# Patient Record
Sex: MALE | Race: OTHER | HISPANIC OR LATINO | URBAN - METROPOLITAN AREA
[De-identification: names, ages, dates, MRNs, and addresses within clinical notes are randomized per-mention and may not be internally consistent; named-entity substitution may affect disease eponyms.]

---

## 2020-04-23 ENCOUNTER — TELEMEDICINE (OUTPATIENT)
Dept: INTERNAL MEDICINE CLINIC | Facility: CLINIC | Age: 35
End: 2020-04-23

## 2020-04-23 DIAGNOSIS — E10.42 TYPE 1 DIABETES MELLITUS WITH DIABETIC POLYNEUROPATHY (HCC): Primary | ICD-10-CM

## 2020-04-23 DIAGNOSIS — K21.9 GASTROESOPHAGEAL REFLUX DISEASE, ESOPHAGITIS PRESENCE NOT SPECIFIED: ICD-10-CM

## 2020-04-23 DIAGNOSIS — R53.83 FATIGUE, UNSPECIFIED TYPE: ICD-10-CM

## 2020-04-23 DIAGNOSIS — F90.2 ATTENTION DEFICIT HYPERACTIVITY DISORDER (ADHD), COMBINED TYPE: ICD-10-CM

## 2020-04-23 PROCEDURE — T1015 CLINIC SERVICE: HCPCS | Performed by: HOSPITALIST

## 2020-04-23 PROCEDURE — 99214 OFFICE O/P EST MOD 30 MIN: CPT | Performed by: HOSPITALIST

## 2020-04-23 RX ORDER — GABAPENTIN 300 MG/1
300 CAPSULE ORAL 3 TIMES DAILY
Qty: 270 CAPSULE | Refills: 1 | Status: SHIPPED | OUTPATIENT
Start: 2020-04-23 | End: 2020-11-11 | Stop reason: SDUPTHER

## 2020-04-23 RX ORDER — LISINOPRIL 5 MG/1
1 TABLET ORAL DAILY
COMMUNITY
Start: 2013-04-03 | End: 2020-05-27 | Stop reason: SDUPTHER

## 2020-04-23 RX ORDER — INSULIN ASPART 100 [IU]/ML
15 INJECTION, SOLUTION INTRAVENOUS; SUBCUTANEOUS
Qty: 5 PEN | Refills: 5 | Status: SHIPPED | OUTPATIENT
Start: 2020-04-23 | End: 2020-11-10 | Stop reason: SDUPTHER

## 2020-04-23 RX ORDER — LANCETS
EACH MISCELLANEOUS 3 TIMES DAILY
Qty: 200 EACH | Refills: 3 | Status: SHIPPED | OUTPATIENT
Start: 2020-04-23

## 2020-04-23 RX ORDER — ATORVASTATIN CALCIUM 10 MG/1
10 TABLET, FILM COATED ORAL DAILY
COMMUNITY
Start: 2014-07-25 | End: 2020-05-27 | Stop reason: SDUPTHER

## 2020-04-23 RX ORDER — OMEPRAZOLE 40 MG/1
40 CAPSULE, DELAYED RELEASE ORAL DAILY
COMMUNITY
Start: 2014-07-25 | End: 2020-04-23 | Stop reason: SDUPTHER

## 2020-04-23 RX ORDER — OMEPRAZOLE 40 MG/1
40 CAPSULE, DELAYED RELEASE ORAL DAILY
Qty: 90 CAPSULE | Refills: 1 | Status: SHIPPED | OUTPATIENT
Start: 2020-04-23 | End: 2020-12-22 | Stop reason: SDUPTHER

## 2020-04-23 RX ORDER — LANCING DEVICE/LANCETS
KIT MISCELLANEOUS 3 TIMES DAILY
Qty: 1 KIT | Refills: 3 | Status: SHIPPED | OUTPATIENT
Start: 2020-04-23

## 2020-04-23 RX ORDER — GABAPENTIN 100 MG/1
100 CAPSULE ORAL 3 TIMES DAILY
COMMUNITY
End: 2020-04-23 | Stop reason: SDUPTHER

## 2020-05-27 DIAGNOSIS — E10.42 TYPE 1 DIABETES MELLITUS WITH DIABETIC POLYNEUROPATHY (HCC): Primary | ICD-10-CM

## 2020-05-27 RX ORDER — ATORVASTATIN CALCIUM 10 MG/1
10 TABLET, FILM COATED ORAL DAILY
Qty: 90 TABLET | Refills: 3 | Status: SHIPPED | OUTPATIENT
Start: 2020-05-27 | End: 2020-12-22 | Stop reason: SDUPTHER

## 2020-05-27 RX ORDER — LISINOPRIL 5 MG/1
5 TABLET ORAL DAILY
Qty: 90 TABLET | Refills: 3 | Status: SHIPPED | OUTPATIENT
Start: 2020-05-27 | End: 2020-12-22 | Stop reason: SDUPTHER

## 2020-11-06 DIAGNOSIS — K21.9 GASTROESOPHAGEAL REFLUX DISEASE: ICD-10-CM

## 2020-11-06 RX ORDER — OMEPRAZOLE 40 MG/1
CAPSULE, DELAYED RELEASE ORAL
Qty: 90 CAPSULE | Refills: 0 | OUTPATIENT
Start: 2020-11-06

## 2020-11-10 ENCOUNTER — TELEPHONE (OUTPATIENT)
Dept: INTERNAL MEDICINE CLINIC | Facility: CLINIC | Age: 35
End: 2020-11-10

## 2020-11-10 DIAGNOSIS — E10.42 TYPE 1 DIABETES MELLITUS WITH DIABETIC POLYNEUROPATHY (HCC): ICD-10-CM

## 2020-11-10 RX ORDER — INSULIN GLARGINE 100 [IU]/ML
50 INJECTION, SOLUTION SUBCUTANEOUS DAILY
Qty: 10 ML | Refills: 3 | Status: SHIPPED | OUTPATIENT
Start: 2020-11-10 | End: 2020-12-22 | Stop reason: ALTCHOICE

## 2020-11-10 RX ORDER — INSULIN GLARGINE 100 [IU]/ML
50 INJECTION, SOLUTION SUBCUTANEOUS
Qty: 5 PEN | Refills: 5 | Status: SHIPPED | OUTPATIENT
Start: 2020-11-10 | End: 2020-12-22

## 2020-11-10 RX ORDER — INSULIN ASPART 100 [IU]/ML
15 INJECTION, SOLUTION INTRAVENOUS; SUBCUTANEOUS
Qty: 5 PEN | Refills: 5 | Status: SHIPPED | OUTPATIENT
Start: 2020-11-10 | End: 2020-12-22 | Stop reason: ALTCHOICE

## 2020-11-10 RX ORDER — GABAPENTIN 100 MG/1
200 CAPSULE ORAL 3 TIMES DAILY
Qty: 90 CAPSULE | Refills: 3 | Status: SHIPPED | OUTPATIENT
Start: 2020-11-10 | End: 2020-12-22 | Stop reason: ALTCHOICE

## 2020-11-11 DIAGNOSIS — E10.42 TYPE 1 DIABETES MELLITUS WITH DIABETIC POLYNEUROPATHY (HCC): ICD-10-CM

## 2020-11-11 RX ORDER — GABAPENTIN 300 MG/1
300 CAPSULE ORAL 3 TIMES DAILY
Qty: 270 CAPSULE | Refills: 2 | Status: SHIPPED | OUTPATIENT
Start: 2020-11-11 | End: 2021-11-29

## 2020-12-22 ENCOUNTER — LAB (OUTPATIENT)
Dept: LAB | Facility: HOSPITAL | Age: 35
End: 2020-12-22
Payer: COMMERCIAL

## 2020-12-22 ENCOUNTER — TRANSCRIBE ORDERS (OUTPATIENT)
Dept: LAB | Facility: HOSPITAL | Age: 35
End: 2020-12-22

## 2020-12-22 ENCOUNTER — OFFICE VISIT (OUTPATIENT)
Dept: INTERNAL MEDICINE CLINIC | Facility: CLINIC | Age: 35
End: 2020-12-22

## 2020-12-22 VITALS
HEIGHT: 69 IN | WEIGHT: 216 LBS | BODY MASS INDEX: 31.99 KG/M2 | SYSTOLIC BLOOD PRESSURE: 120 MMHG | DIASTOLIC BLOOD PRESSURE: 81 MMHG | TEMPERATURE: 97 F | HEART RATE: 81 BPM

## 2020-12-22 DIAGNOSIS — E10.42 TYPE 1 DIABETES MELLITUS WITH DIABETIC POLYNEUROPATHY (HCC): ICD-10-CM

## 2020-12-22 DIAGNOSIS — Z23 NEED FOR TDAP VACCINATION: ICD-10-CM

## 2020-12-22 DIAGNOSIS — N52.9 ERECTILE DYSFUNCTION, UNSPECIFIED ERECTILE DYSFUNCTION TYPE: ICD-10-CM

## 2020-12-22 DIAGNOSIS — E10.42 TYPE 1 DIABETES MELLITUS WITH DIABETIC POLYNEUROPATHY (HCC): Primary | ICD-10-CM

## 2020-12-22 DIAGNOSIS — F32.A DEPRESSION, UNSPECIFIED DEPRESSION TYPE: ICD-10-CM

## 2020-12-22 DIAGNOSIS — K21.9 GASTROESOPHAGEAL REFLUX DISEASE: ICD-10-CM

## 2020-12-22 DIAGNOSIS — R53.83 FATIGUE, UNSPECIFIED TYPE: ICD-10-CM

## 2020-12-22 LAB
ALBUMIN SERPL BCP-MCNC: 4.2 G/DL (ref 3.5–5)
ALP SERPL-CCNC: 72 U/L (ref 46–116)
ALT SERPL W P-5'-P-CCNC: 46 U/L (ref 12–78)
ANION GAP SERPL CALCULATED.3IONS-SCNC: 4 MMOL/L (ref 4–13)
AST SERPL W P-5'-P-CCNC: 24 U/L (ref 5–45)
BILIRUB SERPL-MCNC: 0.6 MG/DL (ref 0.2–1)
BUN SERPL-MCNC: 16 MG/DL (ref 5–25)
CALCIUM SERPL-MCNC: 9.5 MG/DL (ref 8.3–10.1)
CHLORIDE SERPL-SCNC: 103 MMOL/L (ref 100–108)
CHOLEST SERPL-MCNC: 181 MG/DL (ref 50–200)
CO2 SERPL-SCNC: 30 MMOL/L (ref 21–32)
CREAT SERPL-MCNC: 0.95 MG/DL (ref 0.6–1.3)
ERYTHROCYTE [DISTWIDTH] IN BLOOD BY AUTOMATED COUNT: 12.3 % (ref 11.6–15.1)
EST. AVERAGE GLUCOSE BLD GHB EST-MCNC: 120 MG/DL
GFR SERPL CREATININE-BSD FRML MDRD: 105 ML/MIN/1.73SQ M
GLUCOSE P FAST SERPL-MCNC: 144 MG/DL (ref 65–99)
HBA1C MFR BLD: 5.8 %
HCT VFR BLD AUTO: 49.1 % (ref 36.5–49.3)
HDLC SERPL-MCNC: 44 MG/DL
HGB BLD-MCNC: 16.9 G/DL (ref 12–17)
LDLC SERPL CALC-MCNC: 115 MG/DL (ref 0–100)
MCH RBC QN AUTO: 30 PG (ref 26.8–34.3)
MCHC RBC AUTO-ENTMCNC: 34.4 G/DL (ref 31.4–37.4)
MCV RBC AUTO: 87 FL (ref 82–98)
NONHDLC SERPL-MCNC: 137 MG/DL
PLATELET # BLD AUTO: 256 THOUSANDS/UL (ref 149–390)
PMV BLD AUTO: 10.1 FL (ref 8.9–12.7)
POTASSIUM SERPL-SCNC: 3.7 MMOL/L (ref 3.5–5.3)
PROT SERPL-MCNC: 8 G/DL (ref 6.4–8.2)
RBC # BLD AUTO: 5.64 MILLION/UL (ref 3.88–5.62)
SODIUM SERPL-SCNC: 137 MMOL/L (ref 136–145)
TRIGL SERPL-MCNC: 108 MG/DL
TSH SERPL DL<=0.05 MIU/L-ACNC: 0.56 UIU/ML (ref 0.36–3.74)
WBC # BLD AUTO: 7.81 THOUSAND/UL (ref 4.31–10.16)

## 2020-12-22 PROCEDURE — 83036 HEMOGLOBIN GLYCOSYLATED A1C: CPT

## 2020-12-22 PROCEDURE — 80053 COMPREHEN METABOLIC PANEL: CPT

## 2020-12-22 PROCEDURE — 80061 LIPID PANEL: CPT

## 2020-12-22 PROCEDURE — 84443 ASSAY THYROID STIM HORMONE: CPT

## 2020-12-22 PROCEDURE — 90471 IMMUNIZATION ADMIN: CPT | Performed by: HOSPITALIST

## 2020-12-22 PROCEDURE — 85027 COMPLETE CBC AUTOMATED: CPT

## 2020-12-22 PROCEDURE — 90715 TDAP VACCINE 7 YRS/> IM: CPT | Performed by: HOSPITALIST

## 2020-12-22 PROCEDURE — 99213 OFFICE O/P EST LOW 20 MIN: CPT | Performed by: HOSPITALIST

## 2020-12-22 PROCEDURE — 36415 COLL VENOUS BLD VENIPUNCTURE: CPT

## 2020-12-22 RX ORDER — FLUOXETINE HYDROCHLORIDE 20 MG/1
20 CAPSULE ORAL DAILY
Qty: 30 CAPSULE | Refills: 5 | Status: SHIPPED | OUTPATIENT
Start: 2020-12-22 | End: 2021-07-12

## 2020-12-22 RX ORDER — LISINOPRIL 5 MG/1
5 TABLET ORAL DAILY
Qty: 90 TABLET | Refills: 3 | Status: SHIPPED | OUTPATIENT
Start: 2020-12-22 | End: 2021-03-22

## 2020-12-22 RX ORDER — ATORVASTATIN CALCIUM 10 MG/1
10 TABLET, FILM COATED ORAL DAILY
Qty: 90 TABLET | Refills: 3 | Status: SHIPPED | OUTPATIENT
Start: 2020-12-22 | End: 2021-05-10

## 2020-12-22 RX ORDER — INSULIN LISPRO 100 [IU]/ML
10 INJECTION, SOLUTION INTRAVENOUS; SUBCUTANEOUS
Qty: 5 PEN | Refills: 5 | Status: SHIPPED | OUTPATIENT
Start: 2020-12-22 | End: 2021-05-10

## 2020-12-22 RX ORDER — OMEPRAZOLE 40 MG/1
40 CAPSULE, DELAYED RELEASE ORAL DAILY
Qty: 90 CAPSULE | Refills: 1 | Status: SHIPPED | OUTPATIENT
Start: 2020-12-22 | End: 2021-06-28

## 2020-12-22 RX ORDER — TADALAFIL 10 MG/1
10 TABLET ORAL DAILY PRN
Qty: 30 TABLET | Refills: 1 | Status: SHIPPED | OUTPATIENT
Start: 2020-12-22 | End: 2021-02-24

## 2020-12-22 RX ORDER — INSULIN GLARGINE 100 [IU]/ML
40 INJECTION, SOLUTION SUBCUTANEOUS
Qty: 5 PEN | Refills: 5 | Status: SHIPPED | OUTPATIENT
Start: 2020-12-22 | End: 2021-05-10

## 2021-01-11 ENCOUNTER — TRANSCRIBE ORDERS (OUTPATIENT)
Dept: INTERNAL MEDICINE CLINIC | Facility: CLINIC | Age: 36
End: 2021-01-11

## 2021-01-11 DIAGNOSIS — F32.A DEPRESSION, UNSPECIFIED DEPRESSION TYPE: Primary | ICD-10-CM

## 2021-01-14 ENCOUNTER — PATIENT OUTREACH (OUTPATIENT)
Dept: INTERNAL MEDICINE CLINIC | Facility: CLINIC | Age: 36
End: 2021-01-14

## 2021-01-14 NOTE — PROGRESS NOTES
BERNARDINO has received MD request to assist with [address] referral   Pt is a 36 y/o Type 1 Diabetic with hx of depression  BERNARDINO has spoken with pt who relates he is now on medication which he feels is helping  Pt denies need of additional help at this time  BERNARDINO has explained that therapy and Medication [place] can be beneficial     Pt declines referral at present   BERNARDINO has provided my contact # should he need sources in the future and have encouraged him to call as needed

## 2021-01-26 ENCOUNTER — TELEPHONE (OUTPATIENT)
Dept: INTERNAL MEDICINE CLINIC | Facility: CLINIC | Age: 36
End: 2021-01-26

## 2021-01-26 DIAGNOSIS — E10.42 TYPE 1 DIABETES MELLITUS WITH DIABETIC POLYNEUROPATHY (HCC): Primary | ICD-10-CM

## 2021-01-26 RX ORDER — BLOOD-GLUCOSE METER
EACH MISCELLANEOUS 4 TIMES DAILY
Qty: 1 KIT | Refills: 0 | Status: SHIPPED | OUTPATIENT
Start: 2021-01-26

## 2021-01-26 NOTE — TELEPHONE ENCOUNTER
According to the [place] Preferred Drug List (PDL)* , below are covered glucometer brands         [employer] Glucometers    OneTouch Tophm0KE    OneTouch UltraMini (standard color)QL    OneTouch VerioQL    OneTouch Verio FlexQL    OneTouch Verio IQQL    OneTouch Verio ReflectQL     [employer] Test Strips    OneTouch Ultra BlueQL    OneTouch [employer]

## 2021-01-26 NOTE — TELEPHONE ENCOUNTER
Patient called, his glucometer broke       Please send a script to the pharmacy for the following items:    · Glucometer (brand covered by "[employer]  · Test Strips for the above referenced glucometer  · Lancets    Patient is DM Type 1- PLEASE SEND ASAP TO [address]

## 2021-01-29 ENCOUNTER — TELEPHONE (OUTPATIENT)
Dept: INTERNAL MEDICINE CLINIC | Facility: CLINIC | Age: 36
End: 2021-01-29

## 2021-02-24 DIAGNOSIS — N52.9 ERECTILE DYSFUNCTION, UNSPECIFIED ERECTILE DYSFUNCTION TYPE: ICD-10-CM

## 2021-02-24 RX ORDER — TADALAFIL 10 MG/1
TABLET ORAL
Qty: 30 TABLET | Refills: 0 | Status: SHIPPED | OUTPATIENT
Start: 2021-02-24 | End: 2021-03-30 | Stop reason: SDUPTHER

## 2021-03-30 DIAGNOSIS — N52.9 ERECTILE DYSFUNCTION, UNSPECIFIED ERECTILE DYSFUNCTION TYPE: ICD-10-CM

## 2021-03-30 NOTE — TELEPHONE ENCOUNTER
Name of medication, dose, quantity and frequency  Requested Prescriptions     Pending Prescriptions Disp Refills    tadalafil (CIALIS) 10 MG tablet 30 tablet 0       Number of refills left:    Amount of medication left:    Pharmacy verified and updated    Additional information:

## 2021-03-31 RX ORDER — TADALAFIL 10 MG/1
TABLET ORAL
Qty: 30 TABLET | Refills: 3 | Status: SHIPPED | OUTPATIENT
Start: 2021-03-31 | End: 2021-05-10 | Stop reason: ALTCHOICE

## 2021-05-10 ENCOUNTER — OFFICE VISIT (OUTPATIENT)
Dept: INTERNAL MEDICINE CLINIC | Facility: CLINIC | Age: 36
End: 2021-05-10

## 2021-05-10 VITALS
SYSTOLIC BLOOD PRESSURE: 149 MMHG | WEIGHT: 222 LBS | HEART RATE: 98 BPM | BODY MASS INDEX: 32.88 KG/M2 | HEIGHT: 69 IN | TEMPERATURE: 97 F | DIASTOLIC BLOOD PRESSURE: 84 MMHG

## 2021-05-10 DIAGNOSIS — I10 ESSENTIAL HYPERTENSION: Primary | ICD-10-CM

## 2021-05-10 DIAGNOSIS — E10.42 TYPE 1 DIABETES MELLITUS WITH DIABETIC POLYNEUROPATHY (HCC): ICD-10-CM

## 2021-05-10 DIAGNOSIS — N52.9 ERECTILE DYSFUNCTION, UNSPECIFIED ERECTILE DYSFUNCTION TYPE: ICD-10-CM

## 2021-05-10 LAB — SL AMB POCT HEMOGLOBIN AIC: 8.4 (ref ?–6.5)

## 2021-05-10 PROCEDURE — 83036 HEMOGLOBIN GLYCOSYLATED A1C: CPT | Performed by: INTERNAL MEDICINE

## 2021-05-10 PROCEDURE — 99213 OFFICE O/P EST LOW 20 MIN: CPT | Performed by: INTERNAL MEDICINE

## 2021-05-10 RX ORDER — TADALAFIL 20 MG/1
20 TABLET ORAL DAILY PRN
Qty: 30 TABLET | Refills: 1 | Status: SHIPPED | OUTPATIENT
Start: 2021-05-10 | End: 2021-08-08

## 2021-05-10 RX ORDER — BLOOD PRESSURE TEST KIT
KIT MISCELLANEOUS DAILY
Qty: 1 KIT | Refills: 0 | Status: SHIPPED | OUTPATIENT
Start: 2021-05-10

## 2021-05-10 RX ORDER — INSULIN LISPRO 100 [IU]/ML
INJECTION, SOLUTION INTRAVENOUS; SUBCUTANEOUS
Qty: 31.5 ML | Refills: 3 | Status: SHIPPED | OUTPATIENT
Start: 2021-05-10 | End: 2022-03-02

## 2021-05-10 RX ORDER — INSULIN GLARGINE 100 [IU]/ML
45 INJECTION, SOLUTION SUBCUTANEOUS
Qty: 40.5 ML | Refills: 3 | Status: SHIPPED | OUTPATIENT
Start: 2021-05-10 | End: 2022-02-07

## 2021-05-10 NOTE — PROGRESS NOTES
ASSESSMENT/PLAN:  Diagnoses and all orders for this visit:    Essential hypertension  -     Blood Pressure Monitor KIT; Use daily    Type 1 diabetes mellitus with diabetic polyneuropathy (HCC)  -     POCT hemoglobin A1c  -     insulin lispro (HumaLOG KwikPen) 100 units/mL injection pen; Inject 10 Units under the skin daily with breakfast AND 10 Units daily with lunch AND 15 Units daily with dinner   -     insulin glargine (Lantus SoloStar) 100 units/mL injection pen; Inject 45 Units under the skin daily at bedtime  - Patient states that BG before breakfast is 120-160 and 2-3 hrs after dinner it is 200-220  - I re-iterated the need for measuring his BG before breakfast daily and alternate measuring it before lunch, dinner, and bedtime  I also asked him to bring in a BG log  Patient was agreeable  - His diet remains inconsistent; We discussed improving his diet and going to a dietician - He seems to be okay with the plan  - Endocrinology and nutritionist referral placed at last visit  Patient agreeable to following up with both  - HbA1c was noted to be 8 4 - will increase lantus from 40 to 45 U and increase dinner lispro to 15 U (lispro now TID 10-10-15)  - Patient asked if he could stop lantus and go on a low carb diet - I made him aware that he will likely go into DKA and need to be hospitalized if he were to stop his lantus  Patient demonstrated understanding  Erectile dysfunction, unspecified erectile dysfunction type  -     tadalafil (CIALIS) 20 MG tablet; Take 1 tablet (20 mg total) by mouth daily as needed for erectile dysfunction      Health Maintenance:  Advised diet and exercise  Advised to refrain from tobacco, alcohol, illicit drug use  Advised medical compliance  Schedule a follow-up appointment in 5 weeks  CHIEF COMPLAINT: T1DM follow up    HISTORY OF PRESENT ILLNESS:    Patient is a 37year old male with T1DM coming in for follow up   He states that he feel hypoglycemic when BG is   His blood sugars never go below that  He also states that once he stopped lisinopril 5, his BG increased a little and he no longer feels hypoglycemic - I suspect that he may have been slightly hypotensive rather than hypoglycemic  We discussed that it was ok to stop lisinopril for now and we will re-evaluate in 5 weeks  The following portions of the patient's history were reviewed and updated as appropriate: allergies, current medications, past family history, past medical history, past social history, past surgical history and problem list     Review of Systems   Constitutional: Negative  HENT: Negative  Respiratory: Negative  Cardiovascular: Negative  Gastrointestinal: Negative  Neurological: Negative  OBJECTIVE:  Vitals:    05/10/21 0935   BP: 149/84   BP Location: Right arm   Patient Position: Sitting   Cuff Size: Adult   Pulse: 98   Temp: (!) 97 °F (36 1 °C)   TempSrc: Temporal   Weight: 101 kg (222 lb)   Height: 5' 8 5" (1 74 m)     Physical Exam  Constitutional:       Appearance: Normal appearance  HENT:      Head: Normocephalic and atraumatic  Mouth/Throat:      Mouth: Mucous membranes are moist       Pharynx: Oropharynx is clear  Eyes:      Extraocular Movements: Extraocular movements intact  Conjunctiva/sclera: Conjunctivae normal    Neck:      Musculoskeletal: Normal range of motion  Cardiovascular:      Rate and Rhythm: Normal rate and regular rhythm  Heart sounds: No murmur  No friction rub  No gallop  Pulmonary:      Effort: Pulmonary effort is normal  No respiratory distress  Breath sounds: Normal breath sounds  No stridor  No wheezing, rhonchi or rales  Chest:      Chest wall: No tenderness  Abdominal:      General: Bowel sounds are normal  There is no distension  Palpations: Abdomen is soft  There is no mass  Tenderness: There is no abdominal tenderness  There is no guarding or rebound        Hernia: No hernia is present  Musculoskeletal: Normal range of motion  Skin:     General: Skin is warm and dry  Neurological:      General: No focal deficit present  Mental Status: He is alert and oriented to person, place, and time  Psychiatric:         Mood and Affect: Mood normal            Current Outpatient Medications:     atorvastatin (LIPITOR) 10 mg tablet, Take 1 tablet (10 mg total) by mouth daily, Disp: 90 tablet, Rfl: 3    Blood Glucose Calibration (ACCU-CHEK SYLVESTER) SOLN, by In Vitro route, Disp: , Rfl:     Blood Glucose Monitoring Suppl (OneTouch Verio) w/Device KIT, Use 4 (four) times a day, Disp: 1 kit, Rfl: 0    FLUoxetine (PROzac) 20 mg capsule, Take 1 capsule (20 mg total) by mouth daily, Disp: 30 capsule, Rfl: 5    gabapentin (NEURONTIN) 300 mg capsule, Take 1 capsule (300 mg total) by mouth 3 (three) times a day, Disp: 270 capsule, Rfl: 2    glucose blood test strip, Measure blood glucose 4 times daily - before meals and at bedtime  , Disp: 420 each, Rfl: 3    insulin glargine (Lantus SoloStar) 100 units/mL injection pen, Inject 45 Units under the skin daily at bedtime, Disp: 40 5 mL, Rfl: 3    insulin lispro (HumaLOG KwikPen) 100 units/mL injection pen, Inject 10 Units under the skin daily with breakfast AND 10 Units daily with lunch AND 15 Units daily with dinner , Disp: 31 5 mL, Rfl: 3    Insulin Pen Needle (PEN NEEDLES) 31G X 8 MM MISC, Use to inject insulin 4 times a day , Disp: , Rfl:     Insulin Syringe-Needle U-100 (INSULIN SYRINGE 1CC/31GX5/16") 31G X 5/16" 1 ML MISC, by Does not apply route, Disp: , Rfl:     Insulin Syringe-Needle U-100 31G X 5/16" 0 5 ML MISC, Inject 4 times per day, Disp: , Rfl:     Lancets Misc   (RELION LANCING DEVICE) KIT, by Does not apply route 3 (three) times a day, Disp: 1 kit, Rfl: 3    omeprazole (PriLOSEC) 40 MG capsule, Take 1 capsule (40 mg total) by mouth daily, Disp: 90 capsule, Rfl: 1    ReliOn Ultra Thin Lancets MISC, by Does not apply route 3 (three) times a day Check 3 times daily with 1 being fasting a m , Disp: 200 each, Rfl: 3    Ultra Thin Lancets MISC, by Does not apply route, Disp: , Rfl:     Blood Pressure Monitor KIT, Use daily, Disp: 1 kit, Rfl: 0    cyclobenzaprine (FLEXERIL) 10 mg tablet, Take 10 mg by mouth Three times daily as needed, Disp: , Rfl:     Glucagon HCl 1 MG SOLR, Inject 1 mg under the skin, Disp: , Rfl:     Lancets Ultra Thin 30G MISC, by Does not apply route, Disp: , Rfl:     lisinopril (ZESTRIL) 5 mg tablet, Take 1 tablet (5 mg total) by mouth daily (Patient not taking: Reported on 5/10/2021), Disp: 90 tablet, Rfl: 3    tadalafil (CIALIS) 20 MG tablet, Take 1 tablet (20 mg total) by mouth daily as needed for erectile dysfunction, Disp: 30 tablet, Rfl: 1    History reviewed  No pertinent past medical history  History reviewed  No pertinent surgical history    Social History     Socioeconomic History    Marital status: Single     Spouse name: Not on file    Number of children: Not on file    Years of education: Not on file    Highest education level: Not on file   Occupational History    Not on file   Social Needs    Financial resource strain: Not on file    Food insecurity     Worry: Not on file     Inability: Not on file    Transportation needs     Medical: Not on file     Non-medical: Not on file   Tobacco Use    Smoking status: Never Smoker    Smokeless tobacco: Never Used   Substance and Sexual Activity    Alcohol use: Never     Frequency: Never    Drug use: Yes     Types: Marijuana     Comment: has medical marijuana card    Sexual activity: Yes     Partners: Female   Lifestyle    Physical activity     Days per week: Not on file     Minutes per session: Not on file    Stress: Not on file   Relationships    Social connections     Talks on phone: Not on file     Gets together: Not on file     Attends Caodaism service: Not on file     Active member of club or organization: Not on file     Attends meetings of clubs or organizations: Not on file     Relationship status: Not on file    Intimate partner violence     Fear of current or ex partner: Not on file     Emotionally abused: Not on file     Physically abused: Not on file     Forced sexual activity: Not on file   Other Topics Concern    Not on file   Social History Narrative    Not on file     Family History   Problem Relation Age of Onset    Diabetes Mother     Stroke Maternal Grandfather        ==  MD [address] Internal Medicine PGY-2    [facility]  [address] , [address], [address] [facility]  Office: [phone]  Fax: [phone]

## 2021-05-25 DIAGNOSIS — E10.42 TYPE 1 DIABETES MELLITUS WITH DIABETIC POLYNEUROPATHY (HCC): Primary | ICD-10-CM

## 2021-05-25 NOTE — TELEPHONE ENCOUNTER
Name of medication, dose, quantity and frequency  Requested Prescriptions     Pending Prescriptions Disp Refills    Insulin Pen Needle (Pen Needles) 31G X 8 MM MISC            Number of refills left:    Amount of medication left:    Pharmacy verified and updated    Additional information:

## 2021-05-28 DIAGNOSIS — E10.42 TYPE 1 DIABETES MELLITUS WITH DIABETIC POLYNEUROPATHY (HCC): Primary | ICD-10-CM

## 2021-05-28 RX ORDER — PEN NEEDLE, DIABETIC 30 GX3/16"
NEEDLE, DISPOSABLE MISCELLANEOUS
Status: CANCELLED | OUTPATIENT
Start: 2021-05-28

## 2021-05-28 RX ORDER — PEN NEEDLE, DIABETIC 30 GX3/16"
NEEDLE, DISPOSABLE MISCELLANEOUS
Qty: 90 EACH | Refills: 0 | Status: SHIPPED | OUTPATIENT
Start: 2021-05-28 | End: 2021-09-01

## 2021-06-27 DIAGNOSIS — K21.9 GASTROESOPHAGEAL REFLUX DISEASE: ICD-10-CM

## 2021-06-28 RX ORDER — OMEPRAZOLE 40 MG/1
CAPSULE, DELAYED RELEASE ORAL
Qty: 30 CAPSULE | Refills: 0 | Status: SHIPPED | OUTPATIENT
Start: 2021-06-28 | End: 2021-08-08

## 2021-07-11 DIAGNOSIS — F32.A DEPRESSION, UNSPECIFIED DEPRESSION TYPE: ICD-10-CM

## 2021-07-12 RX ORDER — FLUOXETINE HYDROCHLORIDE 20 MG/1
CAPSULE ORAL
Qty: 30 CAPSULE | Refills: 0 | Status: SHIPPED | OUTPATIENT
Start: 2021-07-12 | End: 2021-08-08

## 2021-08-06 DIAGNOSIS — K21.9 GASTROESOPHAGEAL REFLUX DISEASE: ICD-10-CM

## 2021-08-06 DIAGNOSIS — F32.A DEPRESSION, UNSPECIFIED DEPRESSION TYPE: ICD-10-CM

## 2021-08-06 DIAGNOSIS — N52.9 ERECTILE DYSFUNCTION, UNSPECIFIED ERECTILE DYSFUNCTION TYPE: ICD-10-CM

## 2021-08-06 DIAGNOSIS — E10.42 TYPE 1 DIABETES MELLITUS WITH DIABETIC POLYNEUROPATHY (HCC): Primary | ICD-10-CM

## 2021-08-08 RX ORDER — OMEPRAZOLE 40 MG/1
CAPSULE, DELAYED RELEASE ORAL
Qty: 30 CAPSULE | Refills: 0 | Status: SHIPPED | OUTPATIENT
Start: 2021-08-08 | End: 2021-09-27

## 2021-08-08 RX ORDER — FLUOXETINE HYDROCHLORIDE 20 MG/1
CAPSULE ORAL
Qty: 30 CAPSULE | Refills: 0 | Status: SHIPPED | OUTPATIENT
Start: 2021-08-08 | End: 2021-09-28

## 2021-08-08 RX ORDER — TADALAFIL 20 MG/1
TABLET ORAL
Qty: 30 TABLET | Refills: 0 | Status: SHIPPED | OUTPATIENT
Start: 2021-08-08 | End: 2021-09-27

## 2021-08-08 RX ORDER — LANCETS 30 GAUGE
EACH MISCELLANEOUS
Qty: 400 EACH | Refills: 0 | Status: SHIPPED | OUTPATIENT
Start: 2021-08-08 | End: 2021-11-29 | Stop reason: SDUPTHER

## 2021-09-25 DIAGNOSIS — N52.9 ERECTILE DYSFUNCTION, UNSPECIFIED ERECTILE DYSFUNCTION TYPE: ICD-10-CM

## 2021-09-25 DIAGNOSIS — K21.9 GASTROESOPHAGEAL REFLUX DISEASE: ICD-10-CM

## 2021-09-27 DIAGNOSIS — F32.A DEPRESSION, UNSPECIFIED DEPRESSION TYPE: ICD-10-CM

## 2021-09-27 RX ORDER — OMEPRAZOLE 40 MG/1
CAPSULE, DELAYED RELEASE ORAL
Qty: 30 CAPSULE | Refills: 0 | Status: SHIPPED | OUTPATIENT
Start: 2021-09-27 | End: 2021-11-02

## 2021-09-27 RX ORDER — FLUOXETINE HYDROCHLORIDE 20 MG/1
20 CAPSULE ORAL DAILY
Qty: 30 CAPSULE | Refills: 0 | OUTPATIENT
Start: 2021-09-27

## 2021-09-27 RX ORDER — TADALAFIL 20 MG/1
TABLET ORAL
Qty: 30 TABLET | Refills: 0 | Status: SHIPPED | OUTPATIENT
Start: 2021-09-27 | End: 2021-11-02

## 2021-09-27 NOTE — TELEPHONE ENCOUNTER
Requested Prescriptions     Pending Prescriptions Disp Refills    FLUoxetine (PROzac) 20 mg capsule 30 capsule 0     Sig: Take 1 capsule (20 mg total) by mouth daily

## 2021-09-28 RX ORDER — FLUOXETINE HYDROCHLORIDE 20 MG/1
CAPSULE ORAL
Qty: 30 CAPSULE | Refills: 0 | Status: SHIPPED | OUTPATIENT
Start: 2021-09-28 | End: 2021-11-02

## 2021-11-28 DIAGNOSIS — F32.A DEPRESSION, UNSPECIFIED DEPRESSION TYPE: ICD-10-CM

## 2021-11-28 DIAGNOSIS — K21.9 GASTROESOPHAGEAL REFLUX DISEASE: ICD-10-CM

## 2021-11-28 DIAGNOSIS — N52.9 ERECTILE DYSFUNCTION, UNSPECIFIED ERECTILE DYSFUNCTION TYPE: ICD-10-CM

## 2021-11-28 DIAGNOSIS — E10.42 TYPE 1 DIABETES MELLITUS WITH DIABETIC POLYNEUROPATHY (HCC): ICD-10-CM

## 2021-11-29 RX ORDER — TADALAFIL 20 MG/1
TABLET ORAL
Qty: 30 TABLET | Refills: 0 | Status: SHIPPED | OUTPATIENT
Start: 2021-11-29 | End: 2022-01-06

## 2021-11-29 RX ORDER — OMEPRAZOLE 40 MG/1
CAPSULE, DELAYED RELEASE ORAL
Qty: 30 CAPSULE | Refills: 0 | Status: SHIPPED | OUTPATIENT
Start: 2021-11-29 | End: 2022-01-06

## 2021-11-29 RX ORDER — FLUOXETINE HYDROCHLORIDE 20 MG/1
CAPSULE ORAL
Qty: 30 CAPSULE | Refills: 0 | Status: SHIPPED | OUTPATIENT
Start: 2021-11-29 | End: 2022-01-06

## 2021-11-29 RX ORDER — LANCETS 30 GAUGE
EACH MISCELLANEOUS
Qty: 400 EACH | Refills: 0 | Status: SHIPPED | OUTPATIENT
Start: 2021-11-29

## 2022-02-28 ENCOUNTER — HOSPITAL ENCOUNTER (EMERGENCY)
Facility: HOSPITAL | Age: 37
Discharge: HOME | End: 2022-03-01
Attending: EMERGENCY MEDICINE | Admitting: EMERGENCY MEDICINE
Payer: COMMERCIAL

## 2022-02-28 VITALS
DIASTOLIC BLOOD PRESSURE: 81 MMHG | OXYGEN SATURATION: 98 % | RESPIRATION RATE: 18 BRPM | HEART RATE: 100 BPM | SYSTOLIC BLOOD PRESSURE: 137 MMHG | TEMPERATURE: 98.5 F

## 2022-02-28 DIAGNOSIS — R22.0 FACIAL SWELLING: Primary | ICD-10-CM

## 2022-02-28 PROCEDURE — 99282 EMERGENCY DEPT VISIT SF MDM: CPT

## 2022-03-01 DIAGNOSIS — E10.42 TYPE 1 DIABETES MELLITUS WITH DIABETIC POLYNEUROPATHY (HCC): ICD-10-CM

## 2022-03-01 PROCEDURE — NC001 PR NO CHARGE: Performed by: EMERGENCY MEDICINE

## 2022-03-01 NOTE — ED PROVIDER NOTES
History  Chief Complaint   Patient presents with    Oral Swelling     Pt reports increased swelling in right side of mouth, towards the back, radiates to ear, taking ibuprofen to keep swelling down  Pt states that he is supposed to have dental surgery on this area at a later date  Luis Clark is a 31year-old man with a medical history significant for Type 1 diabetes presenting with right sided facial swelling  He has had right lower tooth pain, he has been taking amoxicillin  He has an appointment with a dentist tomorrow for tooth extraction  Tonight, he noticed right sided facial swelling  He has been taking ibuprofen for his pain  Noted some pus drainage into his mouth  He is able to open his mouth without difficulty  No difficulty swallowing, stridor, fevers, chills  Prior to Admission Medications   Prescriptions Last Dose Informant Patient Reported? Taking? B-D ULTRAFINE III SHORT PEN 31G X 8 MM MISC   No No   Sig: USE 1 PEN NEEDLE  4 TIMES DAILY BEFORE MEAL(S) AND AT BEDTIME   Blood Glucose Calibration (ACCU-CHEK SYLVESTER) SOLN   Yes No   Sig: by In Vitro route   Blood Glucose Monitoring Suppl (OneTouch Verio) w/Device KIT   No No   Sig: Use 4 (four) times a day   Blood Pressure Monitor KIT   No No   Sig: Use daily   FLUoxetine (PROzac) 20 mg capsule   No No   Sig: Take 1 capsule by mouth once daily   Glucagon HCl 1 MG SOLR   Yes No   Sig: Inject 1 mg under the skin   Insulin Syringe-Needle U-100 (INSULIN SYRINGE 1CC/31GX5/16") 31G X 5/16" 1 ML MISC   Yes No   Sig: by Does not apply route   Insulin Syringe-Needle U-100 31G X 5/16" 0 5 ML MISC   Yes No   Sig: Inject 4 times per day   Lancets (OneTouch Delica Plus [id number]) MISC   No No   Sig: USE 1  TO CHECK GLUCOSE 4 TIMES DAILY   Lancets Misc   (RELION LANCING DEVICE) KIT   No No   Sig: by Does not apply route 3 (three) times a day   Lancets Ultra Thin 30G MISC   Yes No   Sig: by Does not apply route   ReliOn Ultra Thin Lancets MISC   No No   Sig: by Does not apply route 3 (three) times a day Check 3 times daily with 1 being fasting a m  Ultra Thin Lancets MISC   Yes No   Sig: by Does not apply route   atorvastatin (LIPITOR) 10 mg tablet   No No   Sig: Take 1 tablet (10 mg total) by mouth daily   cyclobenzaprine (FLEXERIL) 10 mg tablet   Yes No   Sig: Take 10 mg by mouth Three times daily as needed   gabapentin (NEURONTIN) 300 mg capsule   No No   Sig: TAKE 1 CAPSULE BY MOUTH THREE TIMES DAILY   glucose blood test strip   No No   Sig: Measure blood glucose 4 times daily - before meals and at bedtime  insulin glargine (Lantus SoloStar) 100 units/mL injection pen   No No   Sig: Inject 45 Units under the skin daily at bedtime   insulin lispro (HumaLOG KwikPen) 100 units/mL injection pen   No No   Sig: Inject 10 Units under the skin daily with breakfast AND 10 Units daily with lunch AND 15 Units daily with dinner  lisinopril (ZESTRIL) 5 mg tablet   No No   Sig: Take 1 tablet (5 mg total) by mouth daily   Patient not taking: Reported on 5/10/2021   omeprazole (PriLOSEC) 40 MG capsule   No No   Sig: Take 1 capsule by mouth once daily   tadalafil (CIALIS) 20 MG tablet   No No   Sig: TAKE 1 TABLET BY MOUTH ONCE DAILY AS NEEDED FOR ERECTILE DYSFUNCTION      Facility-Administered Medications: None       Past Medical History:   Diagnosis Date    Diabetes mellitus ([facility] [address] )        History reviewed  No pertinent surgical history  Family History   Problem Relation Age of Onset    Diabetes Mother     Stroke Maternal Grandfather      I have reviewed and agree with the history as documented      E-Cigarette/Vaping    E-Cigarette Use Current Some Day User      E-Cigarette/Vaping Substances     Social History     Tobacco Use    Smoking status: Never Smoker    Smokeless tobacco: Never Used   Vaping Use    Vaping Use: Some days   Substance Use Topics    Alcohol use: Never    Drug use: Yes     Types: Marijuana     Comment: has medical marijuana card Review of Systems   All other systems reviewed and are negative  Physical Exam  ED Triage Vitals [02/28/22 2327]   Temperature Pulse Respirations Blood Pressure SpO2   98 5 °F (36 9 °C) 100 18 137/81 98 %      Temp Source Heart Rate Source Patient Position - Orthostatic VS BP Location FiO2 (%)   Oral Monitor Sitting Left arm --      Pain Score       --             Orthostatic Vital Signs  Vitals:    02/28/22 2327   BP: 137/81   Pulse: 100   Patient Position - Orthostatic VS: Sitting       Physical Exam  Vitals reviewed  Constitutional:       General: He is not in acute distress  Appearance: He is not ill-appearing  HENT:      Head: Normocephalic and atraumatic  Jaw: There is normal jaw occlusion  No trismus  Comments: Very mild right sided facial swelling  Right Ear: External ear normal       Left Ear: External ear normal       Nose: Nose normal       Mouth/Throat:      Mouth: Mucous membranes are moist       Comments: Minimal pus drainage from right posterior inferior molar  No significant swelling in mouth  Floor of mouth soft  Eyes:      Conjunctiva/sclera: Conjunctivae normal    Cardiovascular:      Rate and Rhythm: Normal rate  Pulmonary:      Effort: Pulmonary effort is normal    Musculoskeletal:         General: No deformity  Cervical back: Normal range of motion  Skin:     General: Skin is warm and dry  Neurological:      General: No focal deficit present  Mental Status: He is alert  ED Medications  Medications - No data to display    Diagnostic Studies  Results Reviewed     None                 No orders to display         Procedures  Procedures      ED Course                                       MDM  Number of Diagnoses or Management Options  Facial swelling  Diagnosis management comments: 30 yo man presenting with right sided facial swelling  Appears to be abscess at base of right lower posterior molar   Patient has been on antibiotics with a scheduled dental appointment tomorrow  Recommended ibuprofen and Tylenol at home, I counseled the patient to be cautious about using NSAIDs with his history of diabetes  Patient left prior to evaluation being complete         Disposition  Final diagnoses:   Facial swelling     Time reflects when diagnosis was documented in both MDM as applicable and the Disposition within this note     Time User Action Codes Description Comment    3/1/2022  5:20 AM Lamin Lindsey Add [R22 0] Facial swelling       ED Disposition     ED Disposition Condition Date/Time Comment    Left from Room after Provider Exam  Tue Mar 1, 2022 12:16 AM       Follow-up Information    None         Discharge Medication List as of 3/1/2022 12:26 AM      CONTINUE these medications which have NOT CHANGED    Details   atorvastatin (LIPITOR) 10 mg tablet Take 1 tablet (10 mg total) by mouth daily, Starting Tue 12/22/2020, Until Mon 5/10/2021, Normal      B-D ULTRAFINE III SHORT PEN 31G X 8 MM MISC USE 1 PEN NEEDLE  4 TIMES DAILY BEFORE MEAL(S) AND AT BEDTIME, Normal      Blood Glucose Calibration (ACCU-CHEK SYLVESTER) SOLN by In Vitro route, Starting Wed 5/20/2015, Historical Med      Blood Glucose Monitoring Suppl (OneTouch Verio) w/Device KIT Use 4 (four) times a day, Starting Tue 1/26/2021, Normal      Blood Pressure Monitor KIT Use daily, Starting Mon 5/10/2021, Normal      cyclobenzaprine (FLEXERIL) 10 mg tablet Take 10 mg by mouth Three times daily as needed, Starting Thu 4/11/2019, Historical Med      FLUoxetine (PROzac) 20 mg capsule Take 1 capsule by mouth once daily, Normal      gabapentin (NEURONTIN) 300 mg capsule TAKE 1 CAPSULE BY MOUTH THREE TIMES DAILY, Normal      Glucagon HCl 1 MG SOLR Inject 1 mg under the skin, Starting Thu 4/4/2013, Historical Med      glucose blood test strip Measure blood glucose 4 times daily - before meals and at bedtime , Normal      insulin glargine (Lantus SoloStar) 100 units/mL injection pen Inject 45 Units under the skin daily at bedtime, Starting Mon 2/7/2022, Normal      insulin lispro (HumaLOG KwikPen) 100 units/mL injection pen Multiple Dosages:Starting Mon 5/10/2021, Last dose on Sun 8/8/2021Inject 10 Units under the skin daily with breakfast AND 10 Units daily with lunch AND 15 Units daily with dinner , Normal      Insulin Syringe-Needle U-100 (INSULIN SYRINGE 1CC/31GX5/16") 31G X 5/16" 1 ML MISC by Does not apply route, Starting Thu 6/19/2014, Historical Med      Insulin Syringe-Needle U-100 31G X 5/16" 0 5 ML MISC Inject 4 times per day, Historical Med      !! Lancets (OneTouch Delica Plus [id number]) MISC USE 1  TO CHECK GLUCOSE 4 TIMES DAILY, Normal      Lancets Misc  (RELION LANCING DEVICE) KIT by Does not apply route 3 (three) times a day, Starting Thu 4/23/2020, Normal      !! Lancets Ultra Thin 30G MISC by Does not apply route, Starting Fri 7/25/2014, Historical Med      lisinopril (ZESTRIL) 5 mg tablet Take 1 tablet (5 mg total) by mouth daily, Starting Tue 12/22/2020, Until Mon 3/22/2021, Normal      omeprazole (PriLOSEC) 40 MG capsule Take 1 capsule by mouth once daily, Normal      !! ReliOn Ultra Thin Lancets MISC by Does not apply route 3 (three) times a day Check 3 times daily with 1 being fasting a m , Starting Thu 4/23/2020, Normal      tadalafil (CIALIS) 20 MG tablet TAKE 1 TABLET BY MOUTH ONCE DAILY AS NEEDED FOR ERECTILE DYSFUNCTION, Normal      !! Ultra Thin Lancets MISC by Does not apply route, Historical Med       !! - Potential duplicate medications found  Please discuss with provider  No discharge procedures on file  PDMP Review     None           ED Provider  Attending physically available and evaluated Lenice Severin I managed the patient along with the ED Attending      Electronically Signed by         Nicky Nino MD  03/01/22 9074

## 2022-03-01 NOTE — ED ATTENDING ATTESTATION
2/28/2022  I, Kameron Jarrell MD, saw and evaluated the patient  I have discussed the patient with the resident/non-physician practitioner and agree with the resident's/non-physician practitioner's findings, Plan of Care, and MDM as documented in the resident's/non-physician practitioner's note, except where noted  All available labs and Radiology studies were reviewed  I was present for key portions of any procedure(s) performed by the resident/non-physician practitioner and I was immediately available to provide assistance  At this point I agree with the current assessment done in the Emergency Department  I have conducted an independent evaluation of this patient a history and physical is as follows:    ED Course     Patient eloped from the ED prior to my examination      Critical Care Time  Procedures

## 2022-03-02 RX ORDER — INSULIN LISPRO 100 [IU]/ML
INJECTION, SOLUTION INTRAVENOUS; SUBCUTANEOUS
Qty: 30 ML | Refills: 0 | Status: SHIPPED | OUTPATIENT
Start: 2022-03-02 | End: 2022-04-05 | Stop reason: SDUPTHER

## 2022-04-05 DIAGNOSIS — E10.42 TYPE 1 DIABETES MELLITUS WITH DIABETIC POLYNEUROPATHY (HCC): ICD-10-CM

## 2022-04-05 RX ORDER — INSULIN LISPRO 100 [IU]/ML
INJECTION, SOLUTION INTRAVENOUS; SUBCUTANEOUS
Qty: 30 ML | Refills: 0 | Status: SHIPPED | OUTPATIENT
Start: 2022-04-05 | End: 2022-06-20

## 2022-04-05 NOTE — TELEPHONE ENCOUNTER
Requested Prescriptions     Pending Prescriptions Disp Refills    insulin lispro (HumaLOG) 100 units/mL injection pen 30 mL 0

## 2022-05-31 DIAGNOSIS — N52.9 ERECTILE DYSFUNCTION, UNSPECIFIED ERECTILE DYSFUNCTION TYPE: ICD-10-CM

## 2022-06-01 RX ORDER — TADALAFIL 20 MG/1
TABLET ORAL
Qty: 60 TABLET | Refills: 0 | Status: SHIPPED | OUTPATIENT
Start: 2022-06-01 | End: 2022-08-05

## 2022-06-20 DIAGNOSIS — E10.42 TYPE 1 DIABETES MELLITUS WITH DIABETIC POLYNEUROPATHY (HCC): ICD-10-CM

## 2022-06-20 RX ORDER — INSULIN LISPRO 100 [IU]/ML
INJECTION, SOLUTION INTRAVENOUS; SUBCUTANEOUS
Qty: 15 ML | Refills: 0 | Status: SHIPPED | OUTPATIENT
Start: 2022-06-20 | End: 2022-07-21 | Stop reason: SDUPTHER

## 2022-07-21 DIAGNOSIS — E10.42 TYPE 1 DIABETES MELLITUS WITH DIABETIC POLYNEUROPATHY (HCC): ICD-10-CM

## 2022-07-21 RX ORDER — INSULIN LISPRO 100 [IU]/ML
INJECTION, SOLUTION INTRAVENOUS; SUBCUTANEOUS
Qty: 15 ML | Refills: 3 | Status: SHIPPED | OUTPATIENT
Start: 2022-07-21

## 2022-07-21 RX ORDER — INSULIN GLARGINE 100 [IU]/ML
45 INJECTION, SOLUTION SUBCUTANEOUS
Qty: 15 ML | Refills: 3 | Status: SHIPPED | OUTPATIENT
Start: 2022-07-21

## 2022-07-21 NOTE — TELEPHONE ENCOUNTER
From: Beba Mercado  To: Office of [address], [place]  Sent: 7/21/2022 12:40 PM EDT  Subject: Medication Renewal Request    Refills have been requested for the following medications:    Other - Lantus SoloStar 100 UNIT/ML Sopn    Preferred pharmacy: [address] [place] [address] [address] [facility], [address] [place] [address]

## 2022-07-21 NOTE — TELEPHONE ENCOUNTER
From: Uriel Gonzalez  To: Office of [address], [place]  Sent: 7/21/2022 12:39 PM EDT  Subject: Medication Renewal Request    Refills have been requested for the following medications:    Other - insulin lispro 100 units/mL injection pen    Preferred pharmacy: [address] [place] [address], [address] [place] [address]

## 2022-08-03 DIAGNOSIS — N52.9 ERECTILE DYSFUNCTION, UNSPECIFIED ERECTILE DYSFUNCTION TYPE: ICD-10-CM

## 2022-08-03 NOTE — TELEPHONE ENCOUNTER
From: Lito Mauricio  To: Office of [address], [place]  Sent: 8/3/2022 2:47 PM EDT  Subject: Medication Renewal Request    Refills have been requested for the following medications:    Other - tadalafil 20 MG tablet    Preferred pharmacy: [address] [place] [address], [address] [place] [address]

## 2022-08-03 NOTE — TELEPHONE ENCOUNTER
Patient sent renewal request through [address]  Script sent   Please schedule for Annual Physical

## 2022-08-05 DIAGNOSIS — N52.9 ERECTILE DYSFUNCTION, UNSPECIFIED ERECTILE DYSFUNCTION TYPE: ICD-10-CM

## 2022-08-05 RX ORDER — TADALAFIL 20 MG/1
TABLET ORAL
Qty: 60 TABLET | Refills: 0 | Status: SHIPPED | OUTPATIENT
Start: 2022-08-05 | End: 2022-10-03 | Stop reason: SDUPTHER

## 2022-08-05 RX ORDER — TADALAFIL 20 MG/1
10 TABLET ORAL DAILY PRN
Qty: 60 TABLET | Refills: 0 | Status: SHIPPED | OUTPATIENT
Start: 2022-08-05 | End: 2022-09-06 | Stop reason: ALTCHOICE

## 2022-09-06 ENCOUNTER — OFFICE VISIT (OUTPATIENT)
Dept: INTERNAL MEDICINE CLINIC | Facility: CLINIC | Age: 37
End: 2022-09-06

## 2022-09-06 ENCOUNTER — APPOINTMENT (OUTPATIENT)
Dept: LAB | Facility: CLINIC | Age: 37
End: 2022-09-06
Payer: COMMERCIAL

## 2022-09-06 VITALS
WEIGHT: 231 LBS | HEART RATE: 88 BPM | HEIGHT: 69 IN | DIASTOLIC BLOOD PRESSURE: 84 MMHG | TEMPERATURE: 98.1 F | BODY MASS INDEX: 34.21 KG/M2 | SYSTOLIC BLOOD PRESSURE: 132 MMHG

## 2022-09-06 DIAGNOSIS — Z13.29 SCREENING FOR THYROID DISORDER: ICD-10-CM

## 2022-09-06 DIAGNOSIS — K59.09 OTHER CONSTIPATION: ICD-10-CM

## 2022-09-06 DIAGNOSIS — F32.A DEPRESSION, UNSPECIFIED DEPRESSION TYPE: ICD-10-CM

## 2022-09-06 DIAGNOSIS — Z13.220 ENCOUNTER FOR LIPID SCREENING FOR CARDIOVASCULAR DISEASE: ICD-10-CM

## 2022-09-06 DIAGNOSIS — Z13.0 SCREENING FOR DEFICIENCY ANEMIA: ICD-10-CM

## 2022-09-06 DIAGNOSIS — F90.2 ATTENTION DEFICIT HYPERACTIVITY DISORDER (ADHD), COMBINED TYPE: ICD-10-CM

## 2022-09-06 DIAGNOSIS — E10.42 TYPE 1 DIABETES MELLITUS WITH DIABETIC POLYNEUROPATHY (HCC): ICD-10-CM

## 2022-09-06 DIAGNOSIS — M65.341 TRIGGER RING FINGER OF RIGHT HAND: Primary | ICD-10-CM

## 2022-09-06 DIAGNOSIS — I10 BENIGN ESSENTIAL HYPERTENSION: ICD-10-CM

## 2022-09-06 DIAGNOSIS — Z13.6 ENCOUNTER FOR LIPID SCREENING FOR CARDIOVASCULAR DISEASE: ICD-10-CM

## 2022-09-06 LAB
ALBUMIN SERPL BCP-MCNC: 3.7 G/DL (ref 3.5–5)
ALP SERPL-CCNC: 56 U/L (ref 46–116)
ALT SERPL W P-5'-P-CCNC: 42 U/L (ref 12–78)
ANION GAP SERPL CALCULATED.3IONS-SCNC: 1 MMOL/L (ref 4–13)
AST SERPL W P-5'-P-CCNC: 21 U/L (ref 5–45)
BASOPHILS # BLD AUTO: 0.04 THOUSANDS/ΜL (ref 0–0.1)
BASOPHILS NFR BLD AUTO: 1 % (ref 0–1)
BILIRUB SERPL-MCNC: 0.46 MG/DL (ref 0.2–1)
BUN SERPL-MCNC: 17 MG/DL (ref 5–25)
CALCIUM SERPL-MCNC: 8.7 MG/DL (ref 8.3–10.1)
CHLORIDE SERPL-SCNC: 108 MMOL/L (ref 96–108)
CHOLEST SERPL-MCNC: 164 MG/DL
CO2 SERPL-SCNC: 30 MMOL/L (ref 21–32)
CREAT SERPL-MCNC: 0.98 MG/DL (ref 0.6–1.3)
CREAT UR-MCNC: 234 MG/DL
EOSINOPHIL # BLD AUTO: 0.17 THOUSAND/ΜL (ref 0–0.61)
EOSINOPHIL NFR BLD AUTO: 3 % (ref 0–6)
ERYTHROCYTE [DISTWIDTH] IN BLOOD BY AUTOMATED COUNT: 12.7 % (ref 11.6–15.1)
GFR SERPL CREATININE-BSD FRML MDRD: 99 ML/MIN/1.73SQ M
GLUCOSE P FAST SERPL-MCNC: 92 MG/DL (ref 65–99)
HCT VFR BLD AUTO: 46.9 % (ref 36.5–49.3)
HDLC SERPL-MCNC: 34 MG/DL
HGB BLD-MCNC: 16.2 G/DL (ref 12–17)
IMM GRANULOCYTES # BLD AUTO: 0.02 THOUSAND/UL (ref 0–0.2)
IMM GRANULOCYTES NFR BLD AUTO: 0 % (ref 0–2)
LDLC SERPL CALC-MCNC: 116 MG/DL (ref 0–100)
LYMPHOCYTES # BLD AUTO: 1.11 THOUSANDS/ΜL (ref 0.6–4.47)
LYMPHOCYTES NFR BLD AUTO: 18 % (ref 14–44)
MCH RBC QN AUTO: 29.8 PG (ref 26.8–34.3)
MCHC RBC AUTO-ENTMCNC: 34.5 G/DL (ref 31.4–37.4)
MCV RBC AUTO: 86 FL (ref 82–98)
MICROALBUMIN UR-MCNC: 34 MG/L (ref 0–20)
MICROALBUMIN/CREAT 24H UR: 15 MG/G CREATININE (ref 0–30)
MONOCYTES # BLD AUTO: 0.45 THOUSAND/ΜL (ref 0.17–1.22)
MONOCYTES NFR BLD AUTO: 7 % (ref 4–12)
NEUTROPHILS # BLD AUTO: 4.53 THOUSANDS/ΜL (ref 1.85–7.62)
NEUTS SEG NFR BLD AUTO: 71 % (ref 43–75)
NRBC BLD AUTO-RTO: 0 /100 WBCS
PLATELET # BLD AUTO: 241 THOUSANDS/UL (ref 149–390)
PMV BLD AUTO: 10.2 FL (ref 8.9–12.7)
POTASSIUM SERPL-SCNC: 3.6 MMOL/L (ref 3.5–5.3)
PROT SERPL-MCNC: 7.6 G/DL (ref 6.4–8.4)
RBC # BLD AUTO: 5.43 MILLION/UL (ref 3.88–5.62)
SL AMB POCT HEMOGLOBIN AIC: 5.9 (ref ?–6.5)
SODIUM SERPL-SCNC: 139 MMOL/L (ref 135–147)
T4 FREE SERPL-MCNC: 0.89 NG/DL (ref 0.76–1.46)
TRIGL SERPL-MCNC: 71 MG/DL
TSH SERPL DL<=0.05 MIU/L-ACNC: 0.37 UIU/ML (ref 0.45–4.5)
WBC # BLD AUTO: 6.32 THOUSAND/UL (ref 4.31–10.16)

## 2022-09-06 PROCEDURE — 3079F DIAST BP 80-89 MM HG: CPT | Performed by: PHYSICIAN ASSISTANT

## 2022-09-06 PROCEDURE — 82043 UR ALBUMIN QUANTITATIVE: CPT | Performed by: PHYSICIAN ASSISTANT

## 2022-09-06 PROCEDURE — 3075F SYST BP GE 130 - 139MM HG: CPT | Performed by: PHYSICIAN ASSISTANT

## 2022-09-06 PROCEDURE — 99214 OFFICE O/P EST MOD 30 MIN: CPT | Performed by: PHYSICIAN ASSISTANT

## 2022-09-06 PROCEDURE — 36415 COLL VENOUS BLD VENIPUNCTURE: CPT

## 2022-09-06 PROCEDURE — 83036 HEMOGLOBIN GLYCOSYLATED A1C: CPT | Performed by: PHYSICIAN ASSISTANT

## 2022-09-06 PROCEDURE — 80053 COMPREHEN METABOLIC PANEL: CPT

## 2022-09-06 PROCEDURE — 84443 ASSAY THYROID STIM HORMONE: CPT

## 2022-09-06 PROCEDURE — 82570 ASSAY OF URINE CREATININE: CPT | Performed by: PHYSICIAN ASSISTANT

## 2022-09-06 PROCEDURE — 85025 COMPLETE CBC W/AUTO DIFF WBC: CPT

## 2022-09-06 PROCEDURE — 84439 ASSAY OF FREE THYROXINE: CPT

## 2022-09-06 PROCEDURE — 80061 LIPID PANEL: CPT

## 2022-09-06 RX ORDER — POLYETHYLENE GLYCOL 3350 17 G/17G
17 POWDER, FOR SOLUTION ORAL DAILY
Qty: 578 G | Refills: 2 | Status: SHIPPED | OUTPATIENT
Start: 2022-09-06

## 2022-09-06 RX ORDER — FLUOXETINE HYDROCHLORIDE 40 MG/1
40 CAPSULE ORAL DAILY
Qty: 90 CAPSULE | Refills: 2 | Status: SHIPPED | OUTPATIENT
Start: 2022-09-06

## 2022-09-06 NOTE — ASSESSMENT & PLAN NOTE
Discussed treatment options, patient agreeable to increase Prozac to 40 mg daily  Discussed treatment course and possible side effects  May take 4-6 weeks to reach maximum efficacy  Will recheck in 3 months, sooner if needed

## 2022-09-06 NOTE — ASSESSMENT & PLAN NOTE
Unremarkable exam today  Likely trigger finger  Discussed etiology and pathology with patient  Stretch daily throughout the day and use cool and warm compresses as tolerated  Continue ibuprofen as needed  Will refer to ortho for further evaluation

## 2022-09-06 NOTE — PROGRESS NOTES
Patient's shoes and socks were not removed      Right Foot/Ankle   Right Foot Inspection      Sensory   Monofilament testing: intact    Left Foot/Ankle  Left Foot Inspection      Sensory   Monofilament testing: intact

## 2022-09-06 NOTE — ASSESSMENT & PLAN NOTE
Lab Results   Component Value Date    HGBA1C 5 9 09/06/2022     Improved from 8 4  Discussed importance of monitoring glucose to monitor for periods of hyperglycemia or hypoglycemia  Discussed caution with insulin and not eating properly or balanced throughout the day  Patient understood  Continue Lantus 45 units every night  Continue Humalog 10 units with breakfast and lunch, 15 units with dinner  Follow up in 3 months, sooner if needed

## 2022-09-06 NOTE — PROGRESS NOTES
Assessment/Plan:    Trigger ring finger of right hand  Unremarkable exam today  Likely trigger finger  Discussed etiology and pathology with patient  Stretch daily throughout the day and use cool and warm compresses as tolerated  Continue ibuprofen as needed  Will refer to ortho for further evaluation  Type 1 diabetes mellitus with diabetic polyneuropathy (HCC)    Lab Results   Component Value Date    HGBA1C 5 9 09/06/2022     Improved from 8 4  Discussed importance of monitoring glucose to monitor for periods of hyperglycemia or hypoglycemia  Discussed caution with insulin and not eating properly or balanced throughout the day  Patient understood  Continue Lantus 45 units every night  Continue Humalog 10 units with breakfast and lunch, 15 units with dinner  Follow up in 3 months, sooner if needed  Benign essential hypertension  Patient has not been taking his lisinopril  He is worried about taking too many medications  Discussed lifestyle modifications to lower BP  Also dicussed that we will check a urine microalbumin  If he does have protein in his urine, will recommend restarting for renal protection  Limit sodium/salt intake  Avoid alcohol and caffeine  Other constipation  Likely diet related  Add more fiber to diet  Drink more water  Start Miralax powder once daily  Major depressive disorder, recurrent ([facility] [address] )  Discussed treatment options, patient agreeable to increase Prozac to 40 mg daily  Discussed treatment course and possible side effects  May take 4-6 weeks to reach maximum efficacy  Will recheck in 3 months, sooner if needed  BMI Counseling: Body mass index is 34 61 kg/m²   The BMI is above normal  Nutrition recommendations include decreasing portion sizes, encouraging healthy choices of fruits and vegetables, decreasing fast food intake, consuming healthier snacks, limiting drinks that contain sugar, moderation in carbohydrate intake, increasing intake of lean protein, reducing intake of saturated and trans fat and reducing intake of cholesterol  Exercise recommendations include moderate physical activity 150 minutes/week, exercising 3-5 times per week and strength training exercises  No pharmacotherapy was ordered  Rationale for BMI follow-up plan is due to patient being overweight or obese  Depression Screening and Follow-up Plan: Patient was screened for depression during today's encounter  They screened negative with a PHQ-2 score of 2  Diagnoses and all orders for this visit:    Trigger ring finger of right hand  -     Ambulatory Referral to Orthopedic Surgery; Future    Type 1 diabetes mellitus with diabetic polyneuropathy (HCC)  -     POCT hemoglobin A1c  -     Microalbumin / creatinine urine ratio    Benign essential hypertension  -     Comprehensive metabolic panel; Future    Other constipation  -     polyethylene glycol (GLYCOLAX) 17 GM/SCOOP powder; Take 17 g by mouth daily    Depression, unspecified depression type  -     FLUoxetine (PROzac) 40 MG capsule; Take 1 capsule (40 mg total) by mouth daily    Attention deficit hyperactivity disorder (ADHD), combined type    Screening for thyroid disorder  -     TSH, 3rd generation with Free T4 reflex; Future    Screening for deficiency anemia  -     CBC and differential; Future    Encounter for lipid screening for cardiovascular disease  -     Lipid Panel with Direct LDL reflex; Future          Subjective:      Patient ID: Rosangela Salvador is a 30 y o  male  Patient is a 30year old male with a PMH of HTN, T1DM, and GERD presenting with complaints of right hand pain  Primarily located at the base of the right ring finger  Sometimes radiates up into forearm  Pain is constant, but worst in the morning  Feels stiff and sometimes get stuck in the flexion position  At worst 8/10, sharp or achy pain  Denies numbness, tingling, or weakness  Denies redness, swelling, or bruising  Denies injury or trauma   He has been taking ibuprofen which helps  He is right hand dominant  Patient has been a diabetic for just over 10 years  Patient states he has been compliant with his insulin regimen, but then admits he does not always use his insulin three times a day  Does not always eat three meals  Supposed to be on 10 units of Humalog with breakfast and lunch and 15 units with dinner  45 units of Lantus at night  Denies checking his sugar  Sometimes feels his sugar is low, maybe 1-2 times a month, but never physically checks his sugar  He does not always eat as he states he is always busy and sometimes forgets  Denies any hyperglycemic events  Denies polyphagia, polydipsia, and polyuria  No changes in weight or appetite  He has a history of neuropathy  Takes gabapentin which helps  No numbness, tingling, or neuropathic pain as long as he takes the gabapentin  Checks feet daily  Overall feeling well  Stopped taking his atorvastatin and lisinopril  He is not sure why  He feels he does not really need them  Requesting to increase Prozac  He feels he needs a little more motivation in the morning  Sometimes feels down and depressed  Denies hopelessness and helplessness  He does sometimes feel anxious  He uses a marijuana vape which helps  Denies SI or HI  The following portions of the patient's history were reviewed and updated as appropriate: allergies, current medications, past family history, past medical history, past social history, past surgical history and problem list     Review of Systems   Constitutional: Negative for activity change, appetite change, chills, diaphoresis, fatigue, fever and unexpected weight change  HENT: Negative for congestion, dental problem, hearing loss, postnasal drip, rhinorrhea, sore throat, tinnitus, trouble swallowing and voice change  Eyes: Negative for visual disturbance  Respiratory: Negative for cough, chest tightness, shortness of breath and wheezing      Cardiovascular: Negative for chest pain, palpitations and leg swelling  Gastrointestinal: Positive for constipation  Negative for abdominal pain, blood in stool, diarrhea, nausea and vomiting  Endocrine: Negative for cold intolerance, heat intolerance, polydipsia, polyphagia and polyuria  Genitourinary: Negative for decreased urine volume, dysuria, frequency, hematuria and urgency  Musculoskeletal: Negative for arthralgias, back pain, gait problem, joint swelling and myalgias  Skin: Negative for color change, pallor, rash and wound  Neurological: Negative for dizziness, tremors, seizures, syncope, weakness, light-headedness, numbness and headaches  Hematological: Negative for adenopathy  Psychiatric/Behavioral: Positive for dysphoric mood  Negative for agitation, behavioral problems, confusion, decreased concentration, hallucinations, self-injury, sleep disturbance and suicidal ideas  The patient is nervous/anxious  The patient is not hyperactive  Objective:      /84   Pulse 88   Temp 98 1 °F (36 7 °C) (Temporal)   Ht 5' 8 5" (1 74 m)   Wt 105 kg (231 lb)   BMI 34 61 kg/m²          Physical Exam  Vitals and nursing note reviewed  Constitutional:       General: He is awake  He is not in acute distress  Appearance: Normal appearance  He is well-developed and well-groomed  He is obese  He is not ill-appearing  HENT:      Head: Normocephalic and atraumatic  Mouth/Throat:      Mouth: Mucous membranes are moist       Pharynx: Oropharynx is clear  No oropharyngeal exudate or posterior oropharyngeal erythema  Eyes:      General: Lids are normal  No scleral icterus  Conjunctiva/sclera: Conjunctivae normal    Cardiovascular:      Rate and Rhythm: Normal rate and regular rhythm  Pulses: Normal pulses  no weak pulses          Dorsalis pedis pulses are 2+ on the right side and 2+ on the left side  Posterior tibial pulses are 2+ on the right side and 2+ on the left side        Heart sounds: Normal heart sounds  No murmur heard  Pulmonary:      Effort: Pulmonary effort is normal  No respiratory distress  Breath sounds: Normal breath sounds and air entry  No stridor  No decreased breath sounds, wheezing, rhonchi or rales  Abdominal:      General: Abdomen is flat  Bowel sounds are normal  There is no distension  Palpations: Abdomen is soft  There is no mass  Tenderness: There is no abdominal tenderness  There is no right CVA tenderness, left CVA tenderness, guarding or rebound  Hernia: No hernia is present  Musculoskeletal:         General: Normal range of motion  Right hand: No swelling, deformity, lacerations, tenderness or bony tenderness  Normal range of motion  Normal strength  Normal sensation  There is no disruption of two-point discrimination  Normal capillary refill  Normal pulse  Left hand: Normal       Cervical back: Full passive range of motion without pain, normal range of motion and neck supple  Right lower leg: No edema  Left lower leg: No edema  Feet:      Right foot:      Skin integrity: No ulcer, skin breakdown, erythema, warmth, callus or dry skin  Left foot:      Skin integrity: No ulcer, skin breakdown, erythema, warmth, callus or dry skin  Lymphadenopathy:      Cervical: No cervical adenopathy  Skin:     General: Skin is warm  Capillary Refill: Capillary refill takes less than 2 seconds  Coloration: Skin is not jaundiced  Findings: No rash  Neurological:      General: No focal deficit present  Mental Status: He is alert and oriented to person, place, and time  Mental status is at baseline  Sensory: Sensation is intact  Motor: Motor function is intact  Coordination: Coordination is intact  Gait: Gait is intact     Psychiatric:         Attention and Perception: Attention and perception normal          Mood and Affect: Mood and affect normal          Speech: Speech normal          Behavior: Behavior normal  Behavior is cooperative  Thought Content: Thought content normal          Cognition and Memory: Cognition and memory normal          Judgment: Judgment normal          Patient's shoes and socks removed  Right Foot/Ankle   Right Foot Inspection  Skin Exam: skin normal and skin intact  No dry skin, no warmth, no callus, no erythema, no maceration, no abnormal color, no pre-ulcer, no ulcer and no callus  Toe Exam: ROM and strength within normal limits  Sensory   Proprioception: intact  Monofilament testing: intact    Vascular  Capillary refills: < 3 seconds  The right DP pulse is 2+  The right PT pulse is 2+  Left Foot/Ankle  Left Foot Inspection  Skin Exam: skin normal and skin intact  No dry skin, no warmth, no erythema, no maceration, normal color, no pre-ulcer, no ulcer and no callus  Toe Exam: ROM and strength within normal limits  Sensory   Proprioception: intact  Monofilament testing: intact    Vascular  Capillary refills: < 3 seconds  The left DP pulse is 2+  The left PT pulse is 2+       Assign Risk Category  No deformity present  No loss of protective sensation  No weak pulses  Risk: 0       PHQ-2/9 Depression Screening    Little interest or pleasure in doing things: 1 - several days  Feeling down, depressed, or hopeless: 1 - several days  PHQ-2 Score: 2  PHQ-2 Interpretation: Negative depression screen

## 2022-09-06 NOTE — ASSESSMENT & PLAN NOTE
Patient has not been taking his lisinopril  He is worried about taking too many medications  Discussed lifestyle modifications to lower BP  Also dicussed that we will check a urine microalbumin  If he does have protein in his urine, will recommend restarting for renal protection  Limit sodium/salt intake  Avoid alcohol and caffeine

## 2022-09-07 DIAGNOSIS — E78.2 MIXED HYPERLIPIDEMIA: ICD-10-CM

## 2022-09-07 DIAGNOSIS — I10 BENIGN ESSENTIAL HYPERTENSION: Primary | ICD-10-CM

## 2022-09-07 DIAGNOSIS — E10.42 TYPE 1 DIABETES MELLITUS WITH DIABETIC POLYNEUROPATHY (HCC): ICD-10-CM

## 2022-09-07 RX ORDER — LISINOPRIL 5 MG/1
5 TABLET ORAL DAILY
Qty: 90 TABLET | Refills: 1 | Status: SHIPPED | OUTPATIENT
Start: 2022-09-07 | End: 2022-12-06

## 2022-09-07 RX ORDER — ROSUVASTATIN CALCIUM 5 MG/1
5 TABLET, COATED ORAL
Qty: 90 TABLET | Refills: 1 | Status: SHIPPED | OUTPATIENT
Start: 2022-09-07

## 2022-10-01 DIAGNOSIS — N52.9 ERECTILE DYSFUNCTION, UNSPECIFIED ERECTILE DYSFUNCTION TYPE: ICD-10-CM

## 2022-10-03 RX ORDER — TADALAFIL 20 MG/1
20 TABLET ORAL DAILY PRN
Qty: 60 TABLET | Refills: 0 | Status: SHIPPED | OUTPATIENT
Start: 2022-10-03

## 2022-10-03 NOTE — TELEPHONE ENCOUNTER
From: Maxwell French  To: Office of Gale Daniels  Sent: 10/1/2022 9:23 AM EDT  Subject: Medication Renewal Request    Refills have been requested for the following medications:    Other - tadalafil 20 MG tablet    Preferred pharmacy: [address] [place] [address]

## 2022-10-21 ENCOUNTER — TELEPHONE (OUTPATIENT)
Dept: OBGYN CLINIC | Facility: OTHER | Age: 37
End: 2022-10-21

## 2022-10-21 NOTE — TELEPHONE ENCOUNTER
Patient is being referred to a hand specialist  Please schedule accordingly      [address]   [phone]

## 2022-11-29 ENCOUNTER — VBI (OUTPATIENT)
Dept: ADMINISTRATIVE | Facility: OTHER | Age: 37
End: 2022-11-29

## 2022-12-08 ENCOUNTER — OFFICE VISIT (OUTPATIENT)
Dept: URGENT CARE | Age: 37
End: 2022-12-08

## 2022-12-08 VITALS
TEMPERATURE: 97.5 F | OXYGEN SATURATION: 97 % | HEART RATE: 91 BPM | RESPIRATION RATE: 12 BRPM | SYSTOLIC BLOOD PRESSURE: 118 MMHG | DIASTOLIC BLOOD PRESSURE: 68 MMHG

## 2022-12-08 DIAGNOSIS — J01.90 ACUTE BACTERIAL SINUSITIS: Primary | ICD-10-CM

## 2022-12-08 DIAGNOSIS — N52.9 ERECTILE DYSFUNCTION, UNSPECIFIED ERECTILE DYSFUNCTION TYPE: ICD-10-CM

## 2022-12-08 DIAGNOSIS — B96.89 ACUTE BACTERIAL SINUSITIS: Primary | ICD-10-CM

## 2022-12-08 RX ORDER — TADALAFIL 20 MG/1
TABLET ORAL
Qty: 60 TABLET | Refills: 0 | Status: SHIPPED | OUTPATIENT
Start: 2022-12-08

## 2022-12-08 RX ORDER — AMOXICILLIN AND CLAVULANATE POTASSIUM 875; 125 MG/1; MG/1
1 TABLET, FILM COATED ORAL EVERY 12 HOURS SCHEDULED
Qty: 14 TABLET | Refills: 0 | Status: SHIPPED | OUTPATIENT
Start: 2022-12-08 | End: 2022-12-15

## 2022-12-08 RX ORDER — PREDNISONE 10 MG/1
10 TABLET ORAL DAILY
Qty: 21 TABLET | Refills: 0 | Status: SHIPPED | OUTPATIENT
Start: 2022-12-08

## 2022-12-08 NOTE — PROGRESS NOTES
[facility]        NAME: Rosalina Bardales is a 30 y o  male  : 1985    MRN: [id number]  DATE: 2022  TIME: 2:13 PM    Assessment and Plan   Acute bacterial sinusitis [J01 90, B96 89]  1  Acute bacterial sinusitis  amoxicillin-clavulanate (AUGMENTIN) 875-125 mg per tablet    predniSONE 10 mg tablet            Patient Instructions       Follow up with PCP in 3-5 days  Proceed to  ER if symptoms worsen  Chief Complaint     Chief Complaint   Patient presents with   • Sinusitis     Sinus pressure and dry nose; reports allergies all year long; taking Flonase and Benadryl   • Nasal Congestion   • Cough   • Nose Bleed         History of Present Illness       HPI  Pt presents today complaining increased sinus apin pressure congestion for past 2 weeks  He also has bad allergies and has been taking medication OTC  Pt was up all night due to his symptoms   He is also having a bad cough and a lot of nasal congestion   Review of Systems   Review of Systems  pe rhpi     Current Medications       Current Outpatient Medications:   •  amoxicillin-clavulanate (AUGMENTIN) 875-125 mg per tablet, Take 1 tablet by mouth every 12 (twelve) hours for 7 days, Disp: 14 tablet, Rfl: 0  •  predniSONE 10 mg tablet, Take 1 tablet (10 mg total) by mouth daily 6 tab day 1, 5 tab day 2, 4 tab day 3, 3 tab day 4, 2 tab day 5, 1 tab day 6, Disp: 21 tablet, Rfl: 0  •  tadalafil (CIALIS) 20 MG tablet, TAKE 1 TABLET BY MOUTH ONCE DAILY AS NEEDED FOR ERECTILE DYSFUNCTION, Disp: 60 tablet, Rfl: 0  •  B-D ULTRAFINE III SHORT PEN 31G X 8 MM MISC, USE 1 PEN NEEDLE  4 TIMES DAILY BEFORE MEAL(S) AND AT BEDTIME, Disp: 100 each, Rfl: 10  •  Blood Glucose Calibration (ACCU-CHEK SYLVESTER) SOLN, by In Vitro route, Disp: , Rfl:   •  Blood Glucose Monitoring Suppl (OneTouch Verio) w/Device KIT, Use 4 (four) times a day, Disp: 1 kit, Rfl: 0  •  Blood Pressure Monitor KIT, Use daily (Patient not taking: Reported on 2022), Disp: 1 kit, Rfl: 0  •  FLUoxetine (PROzac) 40 MG capsule, Take 1 capsule (40 mg total) by mouth daily, Disp: 90 capsule, Rfl: 2  •  gabapentin (NEURONTIN) 300 mg capsule, TAKE 1 CAPSULE BY MOUTH THREE TIMES DAILY, Disp: 90 capsule, Rfl: 3  •  Glucagon HCl 1 MG SOLR, Inject 1 mg under the skin (Patient not taking: Reported on 9/6/2022), Disp: , Rfl:   •  glucose blood test strip, Measure blood glucose 4 times daily - before meals and at bedtime  , Disp: 420 each, Rfl: 3  •  Insulin Glargine Solostar (Lantus SoloStar) 100 UNIT/ML SOPN, Inject 45 Units under the skin daily at bedtime, Disp: 15 mL, Rfl: 3  •  insulin lispro (HumaLOG) 100 units/mL injection pen, INJECT 10 UNITS SUBCUTANEOUSLY WITH BREAKFAST AND LUNCH, THEN 15 UNITS WITH DINNER, Disp: 15 mL, Rfl: 3  •  Insulin Syringe-Needle U-100 (INSULIN SYRINGE 1CC/31GX5/16") 31G X 5/16" 1 ML MISC, by Does not apply route, Disp: , Rfl:   •  Insulin Syringe-Needle U-100 31G X 5/16" 0 5 ML MISC, Inject 4 times per day, Disp: , Rfl:   •  Lancets (OneTouch Delica Plus [id number]) MISC, USE 1  TO CHECK GLUCOSE 4 TIMES DAILY, Disp: 400 each, Rfl: 0  •  Lancets Ultra Thin 30G MISC, by Does not apply route, Disp: , Rfl:   •  lisinopril (ZESTRIL) 5 mg tablet, Take 1 tablet (5 mg total) by mouth daily, Disp: 90 tablet, Rfl: 1  •  omeprazole (PriLOSEC) 40 MG capsule, Take 1 capsule by mouth once daily, Disp: 90 capsule, Rfl: 3  •  polyethylene glycol (GLYCOLAX) 17 GM/SCOOP powder, Take 17 g by mouth daily, Disp: 578 g, Rfl: 2  •  rosuvastatin (CRESTOR) 5 mg tablet, Take 1 tablet (5 mg total) by mouth daily at bedtime, Disp: 90 tablet, Rfl: 1    Current Allergies     Allergies as of 12/08/2022   • (No Known Allergies)            The following portions of the patient's history were reviewed and updated as appropriate: allergies, current medications, past family history, past medical history, past social history, past surgical history and problem list      Past Medical History:   Diagnosis Date   • Diabetes mellitus ([facility] [address] )        No past surgical history on file  Family History   Problem Relation Age of Onset   • Diabetes Mother    • Stroke Maternal Grandfather          Medications have been verified  Objective   /68 (BP Location: Left arm, Patient Position: Sitting, Cuff Size: Large)   Pulse 91   Temp 97 5 °F (36 4 °C) (Temporal)   Resp 12   SpO2 97%   No LMP for male patient  Physical Exam     Physical Exam  Constitutional:       General: He is not in acute distress  Appearance: He is well-developed  He is not diaphoretic  HENT:      Head: Normocephalic and atraumatic  Right Ear: Tympanic membrane and external ear normal       Left Ear: Tympanic membrane and external ear normal       Nose: Congestion present  Comments: Tender R max sinus      Mouth/Throat:      Mouth: Mucous membranes are moist       Pharynx: Oropharynx is clear  Posterior oropharyngeal erythema present  No oropharyngeal exudate  Eyes:      Extraocular Movements: Extraocular movements intact  Conjunctiva/sclera: Conjunctivae normal    Cardiovascular:      Rate and Rhythm: Normal rate and regular rhythm  Heart sounds: Normal heart sounds  Pulmonary:      Effort: Pulmonary effort is normal  No respiratory distress  Breath sounds: Normal breath sounds  No wheezing  Abdominal:      General: Bowel sounds are normal  There is no distension  Palpations: Abdomen is soft  There is no mass  Tenderness: There is no abdominal tenderness  Musculoskeletal:         General: No swelling or tenderness  Cervical back: Normal range of motion  Right lower leg: No edema  Left lower leg: No edema  Lymphadenopathy:      Cervical: Cervical adenopathy present  Skin:     General: Skin is warm  Neurological:      Mental Status: He is alert and oriented to person, place, and time

## 2022-12-08 NOTE — LETTER
December 8, 2022     Patient: Jack Schmidt   YOB: 1985   Date of Visit: 12/8/2022       To Whom It May Concern: It is my medical opinion that Jack Schmidt be excused from 12/8 and 12/9 of the year 2022  If you have any questions or concerns, please don't hesitate to call           Sincerely,        Joyce Hdz MD    CC: No Recipients

## 2022-12-14 ENCOUNTER — TELEPHONE (OUTPATIENT)
Dept: INTERNAL MEDICINE CLINIC | Facility: CLINIC | Age: 37
End: 2022-12-14

## 2022-12-14 ENCOUNTER — OFFICE VISIT (OUTPATIENT)
Dept: OBGYN CLINIC | Facility: CLINIC | Age: 37
End: 2022-12-14

## 2022-12-14 VITALS
HEART RATE: 84 BPM | WEIGHT: 220.6 LBS | DIASTOLIC BLOOD PRESSURE: 81 MMHG | SYSTOLIC BLOOD PRESSURE: 121 MMHG | BODY MASS INDEX: 34.62 KG/M2 | HEIGHT: 67 IN

## 2022-12-14 DIAGNOSIS — M77.11 LATERAL EPICONDYLITIS OF RIGHT ELBOW: ICD-10-CM

## 2022-12-14 DIAGNOSIS — M65.341 TRIGGER FINGER, RIGHT RING FINGER: Primary | ICD-10-CM

## 2022-12-14 DIAGNOSIS — E10.42 TYPE 1 DIABETES MELLITUS WITH DIABETIC POLYNEUROPATHY (HCC): ICD-10-CM

## 2022-12-14 RX ORDER — BUPIVACAINE HYDROCHLORIDE 7.5 MG/ML
2.5 INJECTION, SOLUTION EPIDURAL; RETROBULBAR
Status: COMPLETED | OUTPATIENT
Start: 2022-12-14 | End: 2022-12-14

## 2022-12-14 RX ORDER — BETAMETHASONE SODIUM PHOSPHATE AND BETAMETHASONE ACETATE 3; 3 MG/ML; MG/ML
3 INJECTION, SUSPENSION INTRA-ARTICULAR; INTRALESIONAL; INTRAMUSCULAR; SOFT TISSUE
Status: COMPLETED | OUTPATIENT
Start: 2022-12-14 | End: 2022-12-14

## 2022-12-14 RX ORDER — GABAPENTIN 300 MG/1
600 CAPSULE ORAL 3 TIMES DAILY
Qty: 270 CAPSULE | Refills: 1 | Status: SHIPPED | OUTPATIENT
Start: 2022-12-14

## 2022-12-14 RX ADMIN — BUPIVACAINE HYDROCHLORIDE 2.5 ML: 7.5 INJECTION, SOLUTION EPIDURAL; RETROBULBAR at 09:44

## 2022-12-14 RX ADMIN — BETAMETHASONE SODIUM PHOSPHATE AND BETAMETHASONE ACETATE 3 MG: 3; 3 INJECTION, SUSPENSION INTRA-ARTICULAR; INTRALESIONAL; INTRAMUSCULAR; SOFT TISSUE at 09:44

## 2022-12-14 NOTE — TELEPHONE ENCOUNTER
I will refill the gabapentin  Has his neuropathy worsened? Or is it the same, but he just wants more relief with an increased dosage? I will increase it to 600 mg three times a day  Start with a second 300 mg tablet at bedtime for 3 nights, then add a second one at lunch for 3 days, then he can begin 600 mg three times daily (two 300 mg tablets three times daily  Let me know if he has any questions  Please schedule a follow up when possible   Thank you

## 2022-12-14 NOTE — TELEPHONE ENCOUNTER
Please review as the patient sent this message to us via [employer]  Possible I would like to refill my gabapentin prescription and would also like to increase the dosage if possible  I was going to ask for this during my schedule appointment but it was canceled recently

## 2022-12-14 NOTE — PROGRESS NOTES
Hand Surgery History and Physical    12/14/22  9:15 AM  [id number]  [place] [facility]      HPI:  Pt is a 29 yo male who presents for evaluation of his right upper extremity  He notes some pain in his right ring finger  He also experiences some lateral elbow discomfort at times as well  He has been experiencing symptoms for a few months  Denies any specific inciting event  He has not tried any specific treatments thus far  Past Medical History:   Diagnosis Date   • Diabetes mellitus ([address] )        History reviewed  No pertinent surgical history  Family History   Problem Relation Age of Onset   • Diabetes Mother    • Stroke Maternal Grandfather        Review of Systems - History obtained from patient  Ophthalmic ROS: negative  ENT ROS: negative  Respiratory ROS: negative  Cardiovascular ROS: negative  Neurological ROS: negative  Dermatological ROS: negative    Vitals:    12/14/22 0905   BP: 121/81   Pulse: 84       Physical Examination:  General:  NAD  HEENT:  EOMI, perrla, normal ear morphology  Chest:  Unlabored breathing  Heart:  Regular pulse  Abd:  No distension  Extrem: RUE:  Tender at ring finger A1 pulley level, palpable flexor tendon nodule, some catching, tender over elbow lateral epicondyle on palpation, SILT, able to make a full fist complex  Skin:  Dry, pink  Neuro:  AAOx3     Encounter Diagnoses   Name Primary? • Trigger finger, right ring finger Yes   • Lateral epicondylitis of right elbow      Return if symptoms worsen or fail to improve  A/P:  Pt is a 29 yo male with symptoms of a right ring trigger finger and right elbow lateral epicondylitis     -Discussed findings and treatment options   -Exercises program provided for his lateral epicondylitis  -Recommend right ring trigger finger steroid injection today  -F/u PRN  Small joint arthrocentesis  Universal Protocol:  Consent: Verbal consent obtained    Risks and benefits: risks, benefits and alternatives were discussed  Consent given by: patient  Time out: Immediately prior to procedure a "time out" was called to verify the correct patient, procedure, equipment, support staff and site/side marked as required    Timeout called at: 12/14/2022 9:15 AM   Patient understanding: patient states understanding of the procedure being performed  Site marked: the operative site was marked  Patient identity confirmed: verbally with patient    Supporting Documentation  Indications: pain   Procedure Details  Location: ring finger (A1 pulley level) -   Preparation: Patient was prepped and draped in the usual sterile fashion  Needle size: 22 G  Ultrasound guidance: no  Approach: volar  Medications administered: 3 mg betamethasone acetate-betamethasone sodium phosphate 6 (3-3) mg/mL; 2 5 mL bupivacaine (PF) 0 75 %    Patient tolerance: patient tolerated the procedure well with no immediate complications  Dressing:  Sterile dressing applied

## 2023-01-16 DIAGNOSIS — E10.42 TYPE 1 DIABETES MELLITUS WITH DIABETIC POLYNEUROPATHY (HCC): ICD-10-CM

## 2023-01-16 DIAGNOSIS — K21.9 GASTROESOPHAGEAL REFLUX DISEASE: ICD-10-CM

## 2023-01-16 RX ORDER — INSULIN LISPRO 100 [IU]/ML
INJECTION, SOLUTION INTRAVENOUS; SUBCUTANEOUS
Qty: 15 ML | Refills: 5 | Status: SHIPPED | OUTPATIENT
Start: 2023-01-16

## 2023-01-16 RX ORDER — OMEPRAZOLE 40 MG/1
40 CAPSULE, DELAYED RELEASE ORAL DAILY
Qty: 90 CAPSULE | Refills: 3 | Status: SHIPPED | OUTPATIENT
Start: 2023-01-16

## 2023-01-17 ENCOUNTER — OFFICE VISIT (OUTPATIENT)
Dept: URGENT CARE | Age: 38
End: 2023-01-17

## 2023-01-17 VITALS
OXYGEN SATURATION: 98 % | RESPIRATION RATE: 18 BRPM | DIASTOLIC BLOOD PRESSURE: 79 MMHG | SYSTOLIC BLOOD PRESSURE: 119 MMHG | TEMPERATURE: 97.6 F

## 2023-01-17 DIAGNOSIS — J30.2 SEASONAL ALLERGIES: Primary | ICD-10-CM

## 2023-01-17 RX ORDER — MONTELUKAST SODIUM 10 MG/1
10 TABLET ORAL
Qty: 30 TABLET | Refills: 0 | Status: SHIPPED | OUTPATIENT
Start: 2023-01-17

## 2023-01-17 NOTE — LETTER
January 17, 2023     Patient: Jessica Baker   YOB: 1985   Date of Visit: 1/17/2023       To Whom it May Concern:    Jessica Baker was seen in my clinic on 1/17/2023  He may return to work on 01/18/2023  If you have any questions or concerns, please don't hesitate to call           Sincerely,          EVA Pritchard        CC: No Recipients

## 2023-01-17 NOTE — PROGRESS NOTES
[facility]        NAME: Ana Amos is a 30 y o  male  : 1985    MRN: [id number]  DATE: 2023  TIME: 2:17 PM    Assessment and Plan   Seasonal allergies [J30 2]  1  Seasonal allergies  montelukast (SINGULAIR) 10 mg tablet    Ambulatory Referral to Allergy            Patient Instructions     Referral to allergy specialist  Continue with Flonase; start singulair  Follow up with PCP in 3-5 days  Proceed to  ER if symptoms worsen  Chief Complaint     Chief Complaint   Patient presents with   • Cold Like Symptoms     Sneezing, itchy eyes, nasal congestion  Patient has allergy issues         History of Present Illness       HPI   Reports sneezing, itchy eyes and nasal congestion  Chronic Hx of seasonal allergies  Says he takes medication for allergies all year round  Getting worse  Now taking Flonase and benadryl  About 3-4 tabs of benadryl daily  Says he was recently treated with antibiotics, without relief  Says he used Claritin, zyrtec and allegra in past without much relief    Review of Systems   Review of Systems   Constitutional: Negative for chills and fever  HENT: Positive for congestion, rhinorrhea and sneezing  Eyes: Positive for itching  Negative for discharge  Respiratory: Negative for shortness of breath  Cardiovascular: Negative for chest pain  Gastrointestinal: Negative for diarrhea and vomiting  Neurological: Negative for headaches           Current Medications       Current Outpatient Medications:   •  B-D ULTRAFINE III SHORT PEN 31G X 8 MM MISC, USE 1 PEN NEEDLE  4 TIMES DAILY BEFORE MEAL(S) AND AT BEDTIME, Disp: 100 each, Rfl: 10  •  Blood Glucose Calibration (ACCU-CHEK SYLVESTER) SOLN, by In Vitro route, Disp: , Rfl:   •  Blood Glucose Monitoring Suppl (OneTouch Verio) w/Device KIT, Use 4 (four) times a day, Disp: 1 kit, Rfl: 0  •  Blood Pressure Monitor KIT, Use daily, Disp: 1 kit, Rfl: 0  •  FLUoxetine (PROzac) 40 MG capsule, Take 1 capsule (40 mg total) by mouth daily, Disp: 90 capsule, Rfl: 2  •  gabapentin (NEURONTIN) 300 mg capsule, Take 2 capsules (600 mg total) by mouth 3 (three) times a day, Disp: 270 capsule, Rfl: 1  •  Glucagon HCl 1 MG SOLR, Inject 1 mg under the skin, Disp: , Rfl:   •  glucose blood test strip, Measure blood glucose 4 times daily - before meals and at bedtime  , Disp: 420 each, Rfl: 3  •  Insulin Glargine Solostar (Lantus SoloStar) 100 UNIT/ML SOPN, Inject 45 Units under the skin daily at bedtime, Disp: 15 mL, Rfl: 3  •  insulin lispro (HumaLOG) 100 units/mL injection pen, Inject 10 units with breakfast 10 units with lunch and15 units with dinner, Disp: 15 mL, Rfl: 5  •  Insulin Syringe-Needle U-100 (INSULIN SYRINGE 1CC/31GX5/16") 31G X 5/16" 1 ML MISC, by Does not apply route, Disp: , Rfl:   •  Insulin Syringe-Needle U-100 31G X 5/16" 0 5 ML MISC, Inject 4 times per day, Disp: , Rfl:   •  Lancets (OneTouch Delica Plus [id number]) MISC, USE 1  TO CHECK GLUCOSE 4 TIMES DAILY, Disp: 400 each, Rfl: 0  •  montelukast (SINGULAIR) 10 mg tablet, Take 1 tablet (10 mg total) by mouth daily at bedtime, Disp: 30 tablet, Rfl: 0  •  omeprazole (PriLOSEC) 40 MG capsule, Take 1 capsule (40 mg total) by mouth daily, Disp: 90 capsule, Rfl: 3  •  rosuvastatin (CRESTOR) 5 mg tablet, Take 1 tablet (5 mg total) by mouth daily at bedtime, Disp: 90 tablet, Rfl: 1  •  tadalafil (CIALIS) 20 MG tablet, TAKE 1 TABLET BY MOUTH ONCE DAILY AS NEEDED FOR ERECTILE DYSFUNCTION, Disp: 60 tablet, Rfl: 0  •  Lancets Ultra Thin 30G MISC, by Does not apply route, Disp: , Rfl:   •  lisinopril (ZESTRIL) 5 mg tablet, Take 1 tablet (5 mg total) by mouth daily, Disp: 90 tablet, Rfl: 1  •  polyethylene glycol (GLYCOLAX) 17 GM/SCOOP powder, Take 17 g by mouth daily, Disp: 578 g, Rfl: 2  •  predniSONE 10 mg tablet, Take 1 tablet (10 mg total) by mouth daily 6 tab day 1, 5 tab day 2, 4 tab day 3, 3 tab day 4, 2 tab day 5, 1 tab day 6 (Patient not taking: Reported on 1/17/2023), Disp: 21 tablet, Rfl: 0    Current Allergies     Allergies as of 01/17/2023   • (No Known Allergies)            The following portions of the patient's history were reviewed and updated as appropriate: allergies, current medications, past family history, past medical history, past social history, past surgical history and problem list      Past Medical History:   Diagnosis Date   • Diabetes mellitus ([facility] [address] )        No past surgical history on file  Family History   Problem Relation Age of Onset   • Diabetes Mother    • Stroke Maternal Grandfather          Medications have been verified  Objective   /79   Temp 97 6 °F (36 4 °C) (Temporal)   Resp 18   SpO2 98%   No LMP for male patient  Physical Exam     Physical Exam  Constitutional:       Appearance: He is not ill-appearing or diaphoretic  HENT:      Head:      Comments: Palpation of the sinuses did not elicit any tenderness     Right Ear: Tympanic membrane normal       Left Ear: Tympanic membrane normal       Nose: Rhinorrhea present  Mouth/Throat:      Pharynx: No posterior oropharyngeal erythema  Comments: Post nasal drip  Cardiovascular:      Rate and Rhythm: Regular rhythm  Heart sounds: Normal heart sounds  Pulmonary:      Effort: Pulmonary effort is normal       Breath sounds: Normal breath sounds  No wheezing

## 2023-02-10 DIAGNOSIS — E78.2 MIXED HYPERLIPIDEMIA: ICD-10-CM

## 2023-02-10 DIAGNOSIS — I10 BENIGN ESSENTIAL HYPERTENSION: ICD-10-CM

## 2023-02-10 DIAGNOSIS — K21.9 GASTROESOPHAGEAL REFLUX DISEASE: ICD-10-CM

## 2023-02-10 DIAGNOSIS — N52.9 ERECTILE DYSFUNCTION, UNSPECIFIED ERECTILE DYSFUNCTION TYPE: ICD-10-CM

## 2023-02-10 DIAGNOSIS — E10.42 TYPE 1 DIABETES MELLITUS WITH DIABETIC POLYNEUROPATHY (HCC): ICD-10-CM

## 2023-02-10 RX ORDER — INSULIN GLARGINE 100 [IU]/ML
45 INJECTION, SOLUTION SUBCUTANEOUS
Qty: 15 ML | Refills: 3 | Status: SHIPPED | OUTPATIENT
Start: 2023-02-10

## 2023-02-10 RX ORDER — TADALAFIL 20 MG/1
TABLET ORAL
Qty: 60 TABLET | Refills: 0 | Status: SHIPPED | OUTPATIENT
Start: 2023-02-10

## 2023-02-10 RX ORDER — LISINOPRIL 5 MG/1
5 TABLET ORAL DAILY
Qty: 90 TABLET | Refills: 0 | Status: SHIPPED | OUTPATIENT
Start: 2023-02-10 | End: 2023-05-11

## 2023-02-10 RX ORDER — OMEPRAZOLE 40 MG/1
40 CAPSULE, DELAYED RELEASE ORAL DAILY
Qty: 90 CAPSULE | Refills: 0 | Status: SHIPPED | OUTPATIENT
Start: 2023-02-10

## 2023-02-10 RX ORDER — ROSUVASTATIN CALCIUM 5 MG/1
5 TABLET, COATED ORAL
Qty: 90 TABLET | Refills: 0 | Status: SHIPPED | OUTPATIENT
Start: 2023-02-10

## 2023-02-22 ENCOUNTER — AMB VIDEO VISIT (OUTPATIENT)
Dept: OTHER | Facility: HOSPITAL | Age: 38
End: 2023-02-22

## 2023-02-22 DIAGNOSIS — K59.00 CONSTIPATION, UNSPECIFIED CONSTIPATION TYPE: Primary | ICD-10-CM

## 2023-02-22 NOTE — PROGRESS NOTES
Video Visit - Uriel Gonzalez 30 y o  male MRN: [id number]    REQUIRED DOCUMENTATION:         1  This service was provided via [facility]  2  Provider located at [address] [place] [id number] [phone]  3  [facility] provider: Sharad Duncan PA-C   4  Identify all parties in room with patient during [facility] visit:  No one else  5  After connecting through [employer], patient was identified by name and date of birth  Patient was then informed that this was a Telemedicine visit and that the exam was being conducted confidentially over secure lines  My office door was closed  No one else was in the room  Patient acknowledged consent and understanding of privacy and security of the Telemedicine visit  I informed the patient that I have reviewed their record in Epic and presented the opportunity for them to ask any questions regarding the visit today  The patient agreed to participate  HPI  Patient states that he is taking miralax for a constipation issues and lately it is not as effective  He is needing more omeprazole as well and his digestion is slow  On the bad days it will cause right back pain  He has a bowel movement twice in early morning  He states it his stools are hard and he does have to strain  He will take pepto on the bad days  If he takes pepto it seems to help  His pcp told him to start miralax a few months ago  He does tech support and sits a lot  But He states lately he is trying to eat better and exercise  He is staying hydrated  He denies any abdominal pain  He just feels like his stomach bloats up quickly  He had a bowel movement today  He denies any blood in stools  No vomiting  Physical Exam  Constitutional:       General: He is not in acute distress  Appearance: Normal appearance  He is not ill-appearing, toxic-appearing or diaphoretic  HENT:      Head: Normocephalic and atraumatic     Pulmonary:      Effort: Pulmonary effort is normal  Abdominal:      General: Abdomen is flat  Tenderness: There is no abdominal tenderness  There is no guarding or rebound  Skin:     General: Skin is dry  Neurological:      General: No focal deficit present  Mental Status: He is alert and oriented to person, place, and time  Psychiatric:         Mood and Affect: Mood normal          Behavior: Behavior normal          Diagnoses and all orders for this visit:    Constipation, unspecified constipation type  -     Ambulatory Referral to Gastroenterology; Future    referral GI placed  Continue with miralax  Increase fluids, high fiber diet  ER if worsen  Patient Instructions     Constipation   WHAT YOU NEED TO KNOW:   Constipation means you have hard, dry bowel movements, or you go longer than usual between bowel movements  DISCHARGE INSTRUCTIONS:   Call your doctor if:   • You have blood in your bowel movements  • You have a fever and abdominal pain with the constipation  • Your constipation gets worse  • You start to vomit  • You have questions or concerns about your condition or care  Medicines:   • Medicine  such as a laxative may help relax and loosen your intestines to help you have a bowel movement  Your provider may recommend you only use laxatives for a short time  Long-term use may make your bowels dependent on the medicine  • Take your medicine as directed  Contact your healthcare provider if you think your medicine is not helping or if you have side effects  Tell your provider if you are allergic to any medicine  Keep a list of the medicines, vitamins, and herbs you take  Include the amounts, and when and why you take them  Bring the list or the pill bottles to follow-up visits  Carry your medicine list with you in case of an emergency  Relieve constipation:   • A suppository  may be used to help soften your bowel movements  This may make them easier to pass  A suppository is guided into your rectum through your anus  • An enema  is liquid medicine used to clear bowel movement from your rectum  The medicine is put into your rectum through your anus  Prevent constipation:   • Drink liquids as directed  You may need to drink extra liquids to help soften and move your bowels  Ask how much liquid to drink each day and which liquids are best for you  • Eat high-fiber foods  This may help decrease constipation by adding bulk to your bowel movements  High-fiber foods include fruit, vegetables, whole-grain breads and cereals, and beans  Your healthcare provider or dietitian can help you create a high-fiber meal plan  Your provider may also recommend a fiber supplement if you cannot get enough fiber from food  • Exercise regularly  Regular physical activity can help stimulate your intestines  Walking is a good exercise to prevent or relieve constipation  Ask which exercises are best for you  • Schedule a time each day to have a bowel movement  This may help train your body to have regular bowel movements  Bend forward while you are on the toilet to help move the bowel movement out  Sit on the toilet for at least 10 minutes, even if you do not have a bowel movement  • Talk to your healthcare provider about your medicines  Certain medicines, such as opioids, can cause constipation  Your provider may be able to make medicine changes  For example, he or she may change the kind of medicine, or change when you take it  Follow up with your doctor as directed:  Write down your questions so you remember to ask them during your visits  © Copyright [employer] 2022 Information is for End User's use only and may not be sold, redistributed or otherwise used for commercial purposes  The above information is an  only  It is not intended as medical advice for individual conditions or treatments   Talk to your doctor, nurse or pharmacist before following any medical regimen to see if it is safe and effective for you

## 2023-02-22 NOTE — PATIENT INSTRUCTIONS
Constipation   WHAT YOU NEED TO KNOW:   Constipation means you have hard, dry bowel movements, or you go longer than usual between bowel movements  DISCHARGE INSTRUCTIONS:   Call your doctor if:   You have blood in your bowel movements  You have a fever and abdominal pain with the constipation  Your constipation gets worse  You start to vomit  You have questions or concerns about your condition or care  Medicines:   Medicine  such as a laxative may help relax and loosen your intestines to help you have a bowel movement  Your provider may recommend you only use laxatives for a short time  Long-term use may make your bowels dependent on the medicine  Take your medicine as directed  Contact your healthcare provider if you think your medicine is not helping or if you have side effects  Tell your provider if you are allergic to any medicine  Keep a list of the medicines, vitamins, and herbs you take  Include the amounts, and when and why you take them  Bring the list or the pill bottles to follow-up visits  Carry your medicine list with you in case of an emergency  Relieve constipation:   A suppository  may be used to help soften your bowel movements  This may make them easier to pass  A suppository is guided into your rectum through your anus  An enema  is liquid medicine used to clear bowel movement from your rectum  The medicine is put into your rectum through your anus  Prevent constipation:   Drink liquids as directed  You may need to drink extra liquids to help soften and move your bowels  Ask how much liquid to drink each day and which liquids are best for you  Eat high-fiber foods  This may help decrease constipation by adding bulk to your bowel movements  High-fiber foods include fruit, vegetables, whole-grain breads and cereals, and beans  Your healthcare provider or dietitian can help you create a high-fiber meal plan   Your provider may also recommend a fiber supplement if you cannot get enough fiber from food  Exercise regularly  Regular physical activity can help stimulate your intestines  Walking is a good exercise to prevent or relieve constipation  Ask which exercises are best for you  Schedule a time each day to have a bowel movement  This may help train your body to have regular bowel movements  Bend forward while you are on the toilet to help move the bowel movement out  Sit on the toilet for at least 10 minutes, even if you do not have a bowel movement  Talk to your healthcare provider about your medicines  Certain medicines, such as opioids, can cause constipation  Your provider may be able to make medicine changes  For example, he or she may change the kind of medicine, or change when you take it  Follow up with your doctor as directed:  Write down your questions so you remember to ask them during your visits  © Copyright [employer] 2022 Information is for End User's use only and may not be sold, redistributed or otherwise used for commercial purposes  The above information is an  only  It is not intended as medical advice for individual conditions or treatments  Talk to your doctor, nurse or pharmacist before following any medical regimen to see if it is safe and effective for you

## 2023-02-22 NOTE — CARE ANYWHERE EVISITS
Visit Summary for [facility] OLIVIA Greene - Gender: Male - Date of Birth: [id number]  Date: [id number] - Duration: 8 minutes  Patient: [facility] OLIVIA   [id number]  Provider: Cirilo Sun PA-C    Patient Contact Information  Address  [address]; [facility]  [phone]    Visit Topics  Ongoing digestion issue  Currently taking Miralax for constipation issues but digestion seems slow lately  Lately the constipation causes back pain  I also take Omeprazole daily  [Added Alida Lora - [id number]]    Triage Questions   What is your current physical address in the event of a medical emergency? Answer []  Are you allergic to any medications? Answer []  Are you now or could you be pregnant? Answer []  Do you have any immune system compromise or chronic lung   disease? Answer []  Do you have any vulnerable family members in the home (infant, pregnant, cancer, elderly)? Answer []     Conversation Transcripts  [0A][0A] [Notification] You are connected with Cirilo Sun PA-C, Urgent Care Specialist [0A][Notification] Cayla Esposito is located in [place]  [0A][Notification] Cayla Esposito has shared health history  Lala Gore  [0A]    Diagnosis  Constipation, unspecified    Procedures  Value: 01804 Code: CPT-4 UNLISTED E&M SERVICE    Medications Prescribed    No prescriptions ordered    Electronically signed by: Alina Murphy(NPI [id number])

## 2023-02-22 NOTE — LETTER
February 22, 2023     Patient: Lenice Severin  YOB: 1985  Date of Visit: 2/22/2023      To Whom it May Concern:    Lenice Severin is under my professional care  Cody Koenig was seen in my office on 2/22/2023  Please allow him to work from home on 2/22/23  Cody Koenig may return to work on 2/23/23  If you have any questions or concerns, please don't hesitate to call           Sincerely,          Verona Serna PA-C        CC: No Recipients

## 2023-03-06 ENCOUNTER — TELEPHONE (OUTPATIENT)
Dept: INTERNAL MEDICINE CLINIC | Facility: CLINIC | Age: 38
End: 2023-03-06

## 2023-03-06 NOTE — TELEPHONE ENCOUNTER
----- Message from Mark Anthony Herrera sent at 3/6/2023  2:09 PM EST -----  Regarding: Dexcom Prescription  Contact: [phone]  Good Afternoon,    If possible I would like a prescription for a Dexcom kit to monitor my glucose  My insurance told me that a doctor has to send an authorization form to them for the Dexcom kit prescription to be approved  I have medicaid through [employer]  Would this be possible and what would be needed from me?     Thanks  Mark Anthony Herrera

## 2023-04-28 DIAGNOSIS — N52.9 ERECTILE DYSFUNCTION, UNSPECIFIED ERECTILE DYSFUNCTION TYPE: ICD-10-CM

## 2023-04-28 RX ORDER — TADALAFIL 20 MG/1
TABLET ORAL
Qty: 60 TABLET | Refills: 0 | Status: SHIPPED | OUTPATIENT
Start: 2023-04-28

## 2023-05-28 ENCOUNTER — OFFICE VISIT (OUTPATIENT)
Dept: URGENT CARE | Age: 38
End: 2023-05-28

## 2023-05-28 VITALS
TEMPERATURE: 98.8 F | HEART RATE: 81 BPM | DIASTOLIC BLOOD PRESSURE: 86 MMHG | SYSTOLIC BLOOD PRESSURE: 127 MMHG | OXYGEN SATURATION: 99 % | RESPIRATION RATE: 18 BRPM

## 2023-05-28 DIAGNOSIS — J30.2 SEASONAL ALLERGIES: ICD-10-CM

## 2023-05-28 DIAGNOSIS — J01.40 ACUTE NON-RECURRENT PANSINUSITIS: Primary | ICD-10-CM

## 2023-05-28 RX ORDER — PREDNISONE 10 MG/1
10 TABLET ORAL DAILY
Qty: 21 TABLET | Refills: 0 | Status: SHIPPED | OUTPATIENT
Start: 2023-05-28

## 2023-05-28 RX ORDER — AMOXICILLIN AND CLAVULANATE POTASSIUM 875; 125 MG/1; MG/1
1 TABLET, FILM COATED ORAL EVERY 12 HOURS SCHEDULED
Qty: 14 TABLET | Refills: 0 | Status: SHIPPED | OUTPATIENT
Start: 2023-05-28 | End: 2023-06-04

## 2023-05-28 RX ORDER — MONTELUKAST SODIUM 10 MG/1
10 TABLET ORAL
Qty: 30 TABLET | Refills: 0 | Status: SHIPPED | OUTPATIENT
Start: 2023-05-28

## 2023-05-28 NOTE — PROGRESS NOTES
[facility]        NAME: Vicki Ramirez is a 30 y o  male  : 1985    MRN: [id number]  DATE: May 30, 2023  TIME: 1:38 PM    Assessment and Plan   Acute non-recurrent pansinusitis [J01 40]  1  Acute non-recurrent pansinusitis  amoxicillin-clavulanate (AUGMENTIN) 875-125 mg per tablet    predniSONE 10 mg tablet      2  Seasonal allergies  montelukast (SINGULAIR) 10 mg tablet          Patient educated on tight blood glucose control and close monitoring while on course of prednisone  He verbalized understanding and stated he's tolerated them well in the past        Patient Instructions     You have been prescribed antibiotics and steroids - take as directed  Continue current allergy medicine regimen  Follow-up with your PCP  Go to the ED for any severely worsening symptoms  Chief Complaint     Chief Complaint   Patient presents with   • Sinusitis     Patient states that for about 3 weeks he has had sinus congestion and an intermittent bloody nose from dryness and irritation  He notes some cold sweats this past week         History of Present Illness       This is a 31yo male who presents for evaluation of nasal congestion and sinus pressure x3 weeks  Reports multiple episodes of epistaxis after blowing his nose, which he assumes is due to dryness and the frequency with which he's been blowing his nose  Also with a few episodes of chills / sweating  Patient states he thought these episodes were due to low BG however he checks regularly and his blood sugars have been well controlled  Denies known fevers, HA, cough, CP/SOB, and N/V/D  Little to no relief with multiple OTC mediations  He is requesting script for Singulair for allergy symptom relief which has worked for him in the past        Review of Systems   Review of Systems   Constitutional: Positive for chills and diaphoresis  Negative for fever     HENT: Positive for congestion, nosebleeds, postnasal drip, rhinorrhea and sinus "pressure  Negative for sore throat  Eyes: Negative for discharge and redness  Respiratory: Negative for cough and shortness of breath  Cardiovascular: Negative for chest pain  Gastrointestinal: Negative for abdominal pain, diarrhea, nausea and vomiting  Musculoskeletal: Negative for arthralgias and myalgias  Skin: Negative for rash  Neurological: Negative for dizziness, light-headedness and headaches  Current Medications       Current Outpatient Medications:   •  amoxicillin-clavulanate (AUGMENTIN) 875-125 mg per tablet, Take 1 tablet by mouth every 12 (twelve) hours for 7 days, Disp: 14 tablet, Rfl: 0  •  B-D ULTRAFINE III SHORT PEN 31G X 8 MM MISC, USE 1 PEN NEEDLE  4 TIMES DAILY BEFORE MEAL(S) AND AT BEDTIME, Disp: 100 each, Rfl: 10  •  Blood Glucose Calibration (ACCU-CHEK SYLVESTER) SOLN, by In Vitro route, Disp: , Rfl:   •  Blood Glucose Monitoring Suppl (OneTouch Verio) w/Device KIT, Use 4 (four) times a day, Disp: 1 kit, Rfl: 0  •  Blood Pressure Monitor KIT, Use daily, Disp: 1 kit, Rfl: 0  •  FLUoxetine (PROzac) 40 MG capsule, Take 1 capsule (40 mg total) by mouth daily, Disp: 90 capsule, Rfl: 2  •  gabapentin (NEURONTIN) 300 mg capsule, Take 2 capsules (600 mg total) by mouth 3 (three) times a day, Disp: 270 capsule, Rfl: 1  •  Glucagon HCl 1 MG SOLR, Inject 1 mg under the skin, Disp: , Rfl:   •  glucose blood test strip, Measure blood glucose 4 times daily - before meals and at bedtime  , Disp: 420 each, Rfl: 3  •  Insulin Glargine Solostar (Lantus SoloStar) 100 UNIT/ML SOPN, Inject 0 45 mL (45 Units total) under the skin daily at bedtime, Disp: 15 mL, Rfl: 3  •  insulin lispro (HumaLOG) 100 units/mL injection pen, Inject 10 units with breakfast 10 units with lunch and15 units with dinner, Disp: 15 mL, Rfl: 5  •  Insulin Syringe-Needle U-100 (INSULIN SYRINGE 1CC/31GX5/16\") 31G X 5/16\" 1 ML MISC, by Does not apply route, Disp: , Rfl:   •  Insulin Syringe-Needle U-100 31G X 5/16\" 0 5 ML " MISC, Inject 4 times per day, Disp: , Rfl:   •  Lancets (OneTouch Delica Plus [id number]) MISC, USE 1  TO CHECK GLUCOSE 4 TIMES DAILY, Disp: 400 each, Rfl: 0  •  Lancets Ultra Thin 30G MISC, by Does not apply route, Disp: , Rfl:   •  montelukast (SINGULAIR) 10 mg tablet, Take 1 tablet (10 mg total) by mouth daily at bedtime, Disp: 30 tablet, Rfl: 0  •  omeprazole (PriLOSEC) 40 MG capsule, Take 1 capsule (40 mg total) by mouth daily, Disp: 90 capsule, Rfl: 0  •  polyethylene glycol (GLYCOLAX) 17 GM/SCOOP powder, Take 17 g by mouth daily, Disp: 578 g, Rfl: 2  •  predniSONE 10 mg tablet, Take 1 tablet (10 mg total) by mouth daily 6 tab day 1, 5 tab day 2, 4 tab day 3, 3 tab day 4, 2 tab day 5, 1 tab day 6, Disp: 21 tablet, Rfl: 0  •  rosuvastatin (CRESTOR) 5 mg tablet, Take 1 tablet (5 mg total) by mouth daily at bedtime, Disp: 90 tablet, Rfl: 0  •  tadalafil (CIALIS) 20 MG tablet, TAKE 1 TABLET BY MOUTH DAILY AS NEEDED ONE HOUR PRIOR TO SEXUAL ACTIVITY, Disp: 60 tablet, Rfl: 0  •  lisinopril (ZESTRIL) 5 mg tablet, Take 1 tablet (5 mg total) by mouth daily, Disp: 90 tablet, Rfl: 0    Current Allergies     Allergies as of 05/28/2023   • (No Known Allergies)            The following portions of the patient's history were reviewed and updated as appropriate: allergies, current medications, past family history, past medical history, past social history, past surgical history and problem list      Past Medical History:   Diagnosis Date   • Diabetes mellitus ([facility] [address] )        History reviewed  No pertinent surgical history  Family History   Problem Relation Age of Onset   • Diabetes Mother    • Stroke Maternal Grandfather          Medications have been verified  Objective   /86   Pulse 81   Temp 98 8 °F (37 1 °C)   Resp 18   SpO2 99%        Physical Exam     Physical Exam  Vitals and nursing note reviewed  Constitutional:       General: He is not in acute distress       Appearance: He is not ill-appearing, toxic-appearing or diaphoretic  HENT:      Head: Normocephalic  Comments: Frontal and maxillary sinus TTP     Right Ear: Tympanic membrane, ear canal and external ear normal       Left Ear: Tympanic membrane, ear canal and external ear normal       Nose: Congestion and rhinorrhea present  Comments: Boggy nasal turbinates     Mouth/Throat:      Mouth: Mucous membranes are moist       Pharynx: Oropharynx is clear  Posterior oropharyngeal erythema present  No oropharyngeal exudate  Comments: PND  Eyes:      Conjunctiva/sclera: Conjunctivae normal    Cardiovascular:      Rate and Rhythm: Normal rate and regular rhythm  Pulses: Normal pulses  Heart sounds: Normal heart sounds  Pulmonary:      Effort: Pulmonary effort is normal       Breath sounds: Normal breath sounds  Musculoskeletal:         General: Normal range of motion  Cervical back: Normal range of motion and neck supple  Lymphadenopathy:      Cervical: No cervical adenopathy  Skin:     General: Skin is warm and dry  Capillary Refill: Capillary refill takes less than 2 seconds  Neurological:      Mental Status: He is alert and oriented to person, place, and time

## 2023-06-22 DIAGNOSIS — N52.9 ERECTILE DYSFUNCTION, UNSPECIFIED ERECTILE DYSFUNCTION TYPE: ICD-10-CM

## 2023-06-22 RX ORDER — TADALAFIL 20 MG/1
TABLET ORAL
Qty: 60 TABLET | Refills: 0 | Status: SHIPPED | OUTPATIENT
Start: 2023-06-22 | End: 2023-06-22 | Stop reason: SDUPTHER

## 2023-07-04 DIAGNOSIS — N52.9 ERECTILE DYSFUNCTION, UNSPECIFIED ERECTILE DYSFUNCTION TYPE: ICD-10-CM

## 2023-07-04 DIAGNOSIS — E10.42 TYPE 1 DIABETES MELLITUS WITH DIABETIC POLYNEUROPATHY (HCC): ICD-10-CM

## 2023-07-05 RX ORDER — TADALAFIL 20 MG/1
TABLET ORAL
Qty: 60 TABLET | Refills: 0 | Status: SHIPPED | OUTPATIENT
Start: 2023-07-05

## 2023-07-05 RX ORDER — INSULIN GLARGINE 100 [IU]/ML
45 INJECTION, SOLUTION SUBCUTANEOUS
Qty: 15 ML | Refills: 0 | Status: SHIPPED | OUTPATIENT
Start: 2023-07-05

## 2023-07-05 RX ORDER — INSULIN LISPRO 100 [IU]/ML
INJECTION, SOLUTION INTRAVENOUS; SUBCUTANEOUS
Qty: 15 ML | Refills: 0 | Status: SHIPPED | OUTPATIENT
Start: 2023-07-05

## 2023-07-05 RX ORDER — SYRINGE-NEEDLE,INSULIN,0.5 ML 27GX1/2"
SYRINGE, EMPTY DISPOSABLE MISCELLANEOUS
Qty: 200 EACH | Refills: 2 | Status: SHIPPED | OUTPATIENT
Start: 2023-07-05

## 2023-07-25 ENCOUNTER — TELEPHONE (OUTPATIENT)
Dept: INTERNAL MEDICINE CLINIC | Facility: CLINIC | Age: 38
End: 2023-07-25

## 2023-08-08 DIAGNOSIS — E10.42 TYPE 1 DIABETES MELLITUS WITH DIABETIC POLYNEUROPATHY (HCC): ICD-10-CM

## 2023-08-08 RX ORDER — INSULIN GLARGINE-YFGN 100 [IU]/ML
INJECTION, SOLUTION SUBCUTANEOUS
Qty: 15 ML | Refills: 3 | Status: SHIPPED | OUTPATIENT
Start: 2023-08-08

## 2023-08-09 ENCOUNTER — VBI (OUTPATIENT)
Dept: ADMINISTRATIVE | Facility: OTHER | Age: 38
End: 2023-08-09

## 2023-08-19 DIAGNOSIS — K21.9 GASTROESOPHAGEAL REFLUX DISEASE: ICD-10-CM

## 2023-08-21 RX ORDER — OMEPRAZOLE 40 MG/1
40 CAPSULE, DELAYED RELEASE ORAL DAILY
Qty: 90 CAPSULE | Refills: 3 | Status: SHIPPED | OUTPATIENT
Start: 2023-08-21

## 2023-09-06 DIAGNOSIS — N52.9 ERECTILE DYSFUNCTION, UNSPECIFIED ERECTILE DYSFUNCTION TYPE: ICD-10-CM

## 2023-09-06 RX ORDER — TADALAFIL 20 MG/1
TABLET ORAL
Qty: 60 TABLET | Refills: 0 | Status: SHIPPED | OUTPATIENT
Start: 2023-09-06

## 2023-10-07 DIAGNOSIS — E10.42 TYPE 1 DIABETES MELLITUS WITH DIABETIC POLYNEUROPATHY (HCC): ICD-10-CM

## 2023-10-09 RX ORDER — PEN NEEDLE, DIABETIC 31 GX5/16"
NEEDLE, DISPOSABLE MISCELLANEOUS
Qty: 100 EACH | Refills: 0 | Status: SHIPPED | OUTPATIENT
Start: 2023-10-09

## 2023-11-09 ENCOUNTER — VBI (OUTPATIENT)
Dept: ADMINISTRATIVE | Facility: OTHER | Age: 38
End: 2023-11-09

## 2023-11-14 DIAGNOSIS — E78.2 MIXED HYPERLIPIDEMIA: ICD-10-CM

## 2023-11-14 DIAGNOSIS — F32.A DEPRESSION, UNSPECIFIED DEPRESSION TYPE: ICD-10-CM

## 2023-11-14 DIAGNOSIS — E10.42 TYPE 1 DIABETES MELLITUS WITH DIABETIC POLYNEUROPATHY (HCC): ICD-10-CM

## 2023-11-14 DIAGNOSIS — N52.9 ERECTILE DYSFUNCTION, UNSPECIFIED ERECTILE DYSFUNCTION TYPE: ICD-10-CM

## 2023-11-14 DIAGNOSIS — K59.09 OTHER CONSTIPATION: ICD-10-CM

## 2023-11-15 ENCOUNTER — TELEPHONE (OUTPATIENT)
Dept: INTERNAL MEDICINE CLINIC | Facility: CLINIC | Age: 38
End: 2023-11-15

## 2023-11-15 NOTE — TELEPHONE ENCOUNTER
Patient called because he needs more needles. He received B-D ULTRAFINE III SHORT PEN 31G X 8 MM MISC last time. However, patient said that 8mm is too short.

## 2023-11-16 ENCOUNTER — OFFICE VISIT (OUTPATIENT)
Dept: INTERNAL MEDICINE CLINIC | Facility: CLINIC | Age: 38
End: 2023-11-16

## 2023-11-16 VITALS
DIASTOLIC BLOOD PRESSURE: 83 MMHG | BODY MASS INDEX: 34.37 KG/M2 | HEART RATE: 81 BPM | SYSTOLIC BLOOD PRESSURE: 127 MMHG | WEIGHT: 219 LBS | TEMPERATURE: 98.1 F | HEIGHT: 67 IN

## 2023-11-16 DIAGNOSIS — E78.2 MIXED HYPERLIPIDEMIA: ICD-10-CM

## 2023-11-16 DIAGNOSIS — Z11.59 ENCOUNTER FOR HEPATITIS C SCREENING TEST FOR LOW RISK PATIENT: ICD-10-CM

## 2023-11-16 DIAGNOSIS — E10.42 TYPE 1 DIABETES MELLITUS WITH DIABETIC POLYNEUROPATHY (HCC): ICD-10-CM

## 2023-11-16 DIAGNOSIS — Z11.4 ENCOUNTER FOR SCREENING FOR HUMAN IMMUNODEFICIENCY VIRUS (HIV): ICD-10-CM

## 2023-11-16 DIAGNOSIS — J30.89 NON-SEASONAL ALLERGIC RHINITIS DUE TO OTHER ALLERGIC TRIGGER: ICD-10-CM

## 2023-11-16 DIAGNOSIS — E10.42 TYPE 1 DIABETES MELLITUS WITH DIABETIC POLYNEUROPATHY (HCC): Primary | ICD-10-CM

## 2023-11-16 DIAGNOSIS — F33.0 MILD EPISODE OF RECURRENT MAJOR DEPRESSIVE DISORDER (HCC): ICD-10-CM

## 2023-11-16 DIAGNOSIS — I10 BENIGN ESSENTIAL HYPERTENSION: ICD-10-CM

## 2023-11-16 DIAGNOSIS — Z23 ENCOUNTER FOR IMMUNIZATION: ICD-10-CM

## 2023-11-16 DIAGNOSIS — Z00.00 ANNUAL PHYSICAL EXAM: Primary | ICD-10-CM

## 2023-11-16 PROCEDURE — 83036 HEMOGLOBIN GLYCOSYLATED A1C: CPT | Performed by: PHYSICIAN ASSISTANT

## 2023-11-16 PROCEDURE — 99214 OFFICE O/P EST MOD 30 MIN: CPT | Performed by: PHYSICIAN ASSISTANT

## 2023-11-16 PROCEDURE — 90471 IMMUNIZATION ADMIN: CPT | Performed by: PHYSICIAN ASSISTANT

## 2023-11-16 PROCEDURE — 3074F SYST BP LT 130 MM HG: CPT | Performed by: PHYSICIAN ASSISTANT

## 2023-11-16 PROCEDURE — 99395 PREV VISIT EST AGE 18-39: CPT | Performed by: PHYSICIAN ASSISTANT

## 2023-11-16 PROCEDURE — 90686 IIV4 VACC NO PRSV 0.5 ML IM: CPT | Performed by: PHYSICIAN ASSISTANT

## 2023-11-16 PROCEDURE — 3079F DIAST BP 80-89 MM HG: CPT | Performed by: PHYSICIAN ASSISTANT

## 2023-11-16 PROCEDURE — 90677 PCV20 VACCINE IM: CPT | Performed by: PHYSICIAN ASSISTANT

## 2023-11-16 PROCEDURE — 90472 IMMUNIZATION ADMIN EACH ADD: CPT | Performed by: PHYSICIAN ASSISTANT

## 2023-11-16 RX ORDER — PEN NEEDLE, DIABETIC 29 G X1/2"
NEEDLE, DISPOSABLE MISCELLANEOUS
Qty: 200 EACH | Refills: 5 | Status: SHIPPED | OUTPATIENT
Start: 2023-11-16

## 2023-11-16 RX ORDER — LANCETS 30 GAUGE
EACH MISCELLANEOUS
Qty: 200 EACH | Refills: 2 | Status: SHIPPED | OUTPATIENT
Start: 2023-11-16

## 2023-11-16 RX ORDER — GABAPENTIN 300 MG/1
600 CAPSULE ORAL 3 TIMES DAILY
Qty: 540 CAPSULE | Refills: 2 | Status: SHIPPED | OUTPATIENT
Start: 2023-11-16 | End: 2024-02-14

## 2023-11-16 RX ORDER — GABAPENTIN 300 MG/1
600 CAPSULE ORAL 3 TIMES DAILY
Qty: 270 CAPSULE | Refills: 0 | Status: SHIPPED | OUTPATIENT
Start: 2023-11-16 | End: 2023-11-16

## 2023-11-16 RX ORDER — POLYETHYLENE GLYCOL 3350 17 G/17G
POWDER, FOR SOLUTION ORAL
Qty: 510 G | Refills: 0 | Status: SHIPPED | OUTPATIENT
Start: 2023-11-16

## 2023-11-16 RX ORDER — INSULIN LISPRO 100 [IU]/ML
INJECTION, SOLUTION INTRAVENOUS; SUBCUTANEOUS
Qty: 15 ML | Refills: 0 | Status: SHIPPED | OUTPATIENT
Start: 2023-11-16

## 2023-11-16 RX ORDER — TADALAFIL 20 MG/1
TABLET ORAL
Qty: 60 TABLET | Refills: 0 | Status: SHIPPED | OUTPATIENT
Start: 2023-11-16

## 2023-11-16 RX ORDER — FLUOXETINE HYDROCHLORIDE 40 MG/1
40 CAPSULE ORAL DAILY
Qty: 90 CAPSULE | Refills: 0 | Status: SHIPPED | OUTPATIENT
Start: 2023-11-16

## 2023-11-16 RX ORDER — MONTELUKAST SODIUM 10 MG/1
10 TABLET ORAL
Qty: 90 TABLET | Refills: 3 | Status: SHIPPED | OUTPATIENT
Start: 2023-11-16

## 2023-11-16 RX ORDER — ROSUVASTATIN CALCIUM 5 MG/1
5 TABLET, COATED ORAL
Qty: 90 TABLET | Refills: 0 | Status: SHIPPED | OUTPATIENT
Start: 2023-11-16

## 2023-11-16 NOTE — PATIENT INSTRUCTIONS

## 2023-11-16 NOTE — TELEPHONE ENCOUNTER
8 mm is pretty standard and actually the second longest size. As long as it breaks the skin and goes to the subcutaneous fat layer, it is being injected into the correct place. I will see if insurance will cover the 12 mm and send to the pharmacy.

## 2023-11-16 NOTE — PROGRESS NOTES
[facility] NOTODEMA    NAME: dAitya Sarkar  AGE: 41 y.o. SEX: male  : 1985     DATE: 2023     Assessment and Plan:     Problem List Items Addressed This Visit          Endocrine    Type 1 diabetes mellitus with diabetic polyneuropathy ([address])       Lab Results   Component Value Date    HGBA1C 6.6 (A) 2023   Increased to 6.6% from 5.9%. Patient has been nonadherent with his insulin regimen and nutrition. He often changes the units of insulin for both Lantus and Humalog. He often "fasts" or skips meals. He will skip his insulin when he does this. He states he frequently uses [employer] and based off what it says will change his insulin dosing and his eating habits and routine. He also does not check his sugar often. Discussed importance of monitoring glucose to monitor for periods of hyperglycemia or hypoglycemia. Try to check blood sugar 3-4 times daily. Record in log and bring to next visit. Discussed caution with insulin and not eating properly or balanced throughout the day. Patient understood. Continue Lantus 45 units every night. Continue Humalog 10 units with breakfast and lunch, 15 units with dinner. We did discuss meeting with the diabetes educator to teach him more about insulin and nutrition. He was agreeable. Referral placed. Declines endo referral although placed previously. Reminded to schedule eye exam. Declined foot exam today. He states his neuropathy has been well controlled with gabapentin 600 mg TID. Also states he uses medical marijuana prn. Due for urine micro. Follow up in 3 months, sooner if needed.            Relevant Medications    glucose blood test strip    Lancets Ultra Thin 30G MISC    Other Relevant Orders    POCT hemoglobin A1c (Completed)    Ambulatory Referral to Diabetic Education    CBC and differential    Comprehensive metabolic panel    Albumin / creatinine urine ratio Respiratory    Allergic rhinitis due to allergen     Avoid irritants. Continue Singulair 10 mg daily. Relevant Medications    montelukast (SINGULAIR) 10 mg tablet       Cardiovascular and Mediastinum    Benign essential hypertension     BP Readings from Last 3 Encounters:   11/16/23 127/83   05/28/23 127/86   01/17/23 119/79      Continue lisinopril 5 mg daily. Limit sodium and salt intake. Avoid alcohol and caffeine. Relevant Orders    TSH, 3rd generation with Free T4 reflex       Other    Major depressive disorder, recurrent ([address])     Doing well. Continue Prozac 40 mg daily. Mixed hyperlipidemia     Lab Results   Component Value Date    CHOLESTEROL 164 09/06/2022    CHOLESTEROL 181 12/22/2020     Lab Results   Component Value Date    HDL 34 (L) 09/06/2022    HDL 44 12/22/2020    HDL 40 06/01/2015     Lab Results   Component Value Date    TRIG 71 09/06/2022    TRIG 108 12/22/2020    TRIG 119 06/01/2015     Lab Results   Component Value Date    NONHDLC 137 12/22/2020      Lab Results   Component Value Date    LDLCALC 116 (H) 09/06/2022      Continue rosuvastatin 5 mg daily at bedtime. Low fat diet. Will recheck lipid panel. Relevant Orders    Lipid panel    Annual physical exam - Primary     Discussed general health recommendations and screening guidelines. Agreeable to screening lab work. Recommend routine dental and eye exams. Agreeable to influenza and [id number] immunizations today. Tolerated well. Next physical one year.           BMI 34.0-34.9,adult     Other Visit Diagnoses       Encounter for immunization        Relevant Orders    Pneumococcal Conjugate Vaccine 20-valent (Pcv20) (Completed)    influenza vaccine, quadrivalent, 0.5 mL, preservative-free, for adult and pediatric patients 6 mos+ (AFLURIA, FLUARIX, FLULAVAL, FLUZONE) (Completed)    Encounter for screening for human immunodeficiency virus (HIV)        Relevant Orders    HIV 1/2 AG/AB w Reflex [facility] for 2 yr old and above    Encounter for hepatitis C screening test for low risk patient        Relevant Orders    Hepatitis C antibody            Immunizations and preventive care screenings were discussed with patient today. Appropriate education was printed on patient's after visit summary. Counseling:  Alcohol/drug use: discussed moderation in alcohol intake, the recommendations for healthy alcohol use, and avoidance of illicit drug use. Dental Health: discussed importance of regular tooth brushing, flossing, and dental visits. Injury prevention: discussed safety/seat belts, safety helmets, smoke detectors, carbon dioxide detectors, and smoking near bedding or upholstery. Sexual health: discussed sexually transmitted diseases, partner selection, use of condoms, avoidance of unintended pregnancy, and contraceptive alternatives. Exercise: the importance of regular exercise/physical activity was discussed. Recommend exercise 3-5 times per week for at least 30 minutes. BMI Counseling: Body mass index is 34.3 kg/m². The BMI is above normal. Nutrition recommendations include decreasing portion sizes, encouraging healthy choices of fruits and vegetables, decreasing fast food intake, consuming healthier snacks, limiting drinks that contain sugar, moderation in carbohydrate intake, increasing intake of lean protein, reducing intake of saturated and trans fat and reducing intake of cholesterol. Exercise recommendations include moderate physical activity 150 minutes/week, exercising 3-5 times per week and strength training exercises. No pharmacotherapy was ordered. Rationale for BMI follow-up plan is due to patient being overweight or obese. No follow-ups on file. Chief Complaint:     Chief Complaint   Patient presents with    Follow-up     diabetes      History of Present Illness:     Adult Annual Physical   Patient here for a comprehensive physical exam. The patient reports no problems.     Diet and Physical Activity  Diet/Nutrition: well balanced diet, limited junk food, and consuming 3-5 servings of fruits/vegetables daily. Exercise: no formal exercise. Depression Screening  PHQ-2/9 Depression Screening    Little interest or pleasure in doing things: 0 - not at all  Feeling down, depressed, or hopeless: 0 - not at all  Trouble falling or staying asleep, or sleeping too much: 0 - not at all  Feeling tired or having little energy: 0 - not at all  Poor appetite or overeatin - not at all  Feeling bad about yourself - or that you are a failure or have let yourself or your family down: 0 - not at all  Trouble concentrating on things, such as reading the newspaper or watching television: 0 - not at all  Moving or speaking so slowly that other people could have noticed. Or the opposite - being so fidgety or restless that you have been moving around a lot more than usual: 0 - not at all  Thoughts that you would be better off dead, or of hurting yourself in some way: 0 - not at all  PHQ-9 Score: 0   PHQ-9 Interpretation: No or Minimal depression          General Health  Sleep: sleeps well and gets 7-8 hours of sleep on average. Hearing: normal - bilateral.  Vision: no vision problems, most recent eye exam >1 year ago, and wears glasses. Dental: no dental visits for >1 year and brushes teeth twice daily.  Health  History of STDs?: no.    Advanced Care Planning  Do you have an advanced directive? no  Do you have a durable medical power of ? no     Review of Systems:     Review of Systems   Constitutional:  Negative for activity change, appetite change, chills, diaphoresis, fatigue, fever and unexpected weight change. HENT:  Negative for congestion, dental problem, hearing loss, postnasal drip, rhinorrhea, sore throat, tinnitus and trouble swallowing. Eyes:  Negative for visual disturbance. Respiratory:  Negative for cough, chest tightness, shortness of breath and wheezing.     Cardiovascular: Negative for chest pain, palpitations and leg swelling. Gastrointestinal:  Negative for abdominal pain, blood in stool, constipation, diarrhea, nausea and vomiting. Endocrine: Negative for cold intolerance, heat intolerance, polydipsia, polyphagia and polyuria. Genitourinary:  Negative for decreased urine volume, dysuria, frequency, hematuria and urgency. Musculoskeletal:  Negative for arthralgias and myalgias. Skin:  Negative for rash and wound. Neurological:  Positive for numbness (controlled with gabapentin). Negative for dizziness, tremors, seizures, syncope, weakness, light-headedness and headaches. Hematological:  Negative for adenopathy. Psychiatric/Behavioral:  Negative for dysphoric mood, self-injury, sleep disturbance and suicidal ideas. The patient is not nervous/anxious. Past Medical History:     Past Medical History:   Diagnosis Date    Attention deficit hyperactivity disorder (ADHD), combined type 10/05/2015    Diabetes mellitus ([address])       Past Surgical History:     History reviewed. No pertinent surgical history.    Social History:     Social History     Socioeconomic History    Marital status: Single     Spouse name: None    Number of children: None    Years of education: None    Highest education level: None   Occupational History    None   Tobacco Use    Smoking status: Never    Smokeless tobacco: Never   Vaping Use    Vaping Use: Some days   Substance and Sexual Activity    Alcohol use: Never    Drug use: Not Currently     Types: Marijuana     Comment: has medical marijuana card    Sexual activity: Yes     Partners: Female   Other Topics Concern    None   Social History Narrative    None     Social Determinants of Health     Financial Resource Strain: Low Risk  (11/15/2023)    Overall Financial Resource Strain (CARDIA)     Difficulty of Paying Living Expenses: Not hard at all   Food Insecurity: No Food Insecurity (11/15/2023)    Hunger Vital Sign     Worried About Running Out of Food in the Last Year: Never true     [address] in the Last Year: Never true   Transportation Needs: No Transportation Needs (11/15/2023)    PRAPARE - Transportation     Lack of Transportation (Medical): No     Lack of Transportation (Non-Medical): No   Physical Activity: Not on file   Stress: Not on file   Social Connections: Not on file   Intimate Partner Violence: Not on file   Housing Stability: Not on file      Family History:     Family History   Problem Relation Age of Onset    Diabetes Mother     Stroke Maternal Grandfather       Current Medications:     Current Outpatient Medications   Medication Sig Dispense Refill    glucose blood test strip Measure blood glucose 3 times daily - before meals and at bedtime.  200 each 2    Lancets Ultra Thin 30G MISC Use to check sugar three times daily 200 each 2    montelukast (SINGULAIR) 10 mg tablet Take 1 tablet (10 mg total) by mouth daily at bedtime 90 tablet 3    B-D ULTRAFINE III SHORT PEN 31G X 8 MM MISC USE 1 PEN NEEDLE 4 TIMES DAILY, BEFORE MEAL(S) AND AT BEDTIME 100 each 0    Blood Glucose Calibration (ACCU-CHEK SYLVESTER) SOLN by In Vitro route      Blood Glucose Monitoring Suppl (OneTouch Verio) w/Device KIT Use 4 (four) times a day 1 kit 0    Blood Pressure Monitor KIT Use daily 1 kit 0    FLUoxetine (PROzac) 40 MG capsule Take 1 capsule by mouth once daily 90 capsule 0    gabapentin (NEURONTIN) 300 mg capsule Take 2 capsules (600 mg total) by mouth 3 (three) times a day 540 capsule 2    Glucagon HCl 1 MG SOLR Inject 1 mg under the skin      Insulin Glargine-yfgn 100 UNIT/ML SOPN INJECT 45 UNITS SUBCUTANEOUSLY ONCE DAILY AT BEDTIME 15 mL 3    insulin lispro (HumaLOG) 100 units/mL injection pen INJECT 10 UNITS WITH BREAKFAST AND 10 UNITS WITH LUNCH AND 15 UNITS WITH DINNER 15 mL 0    Insulin Pen Needle (PEN NEEDLES 29GX1/2") 29G X 12MM MISC Use with insulin four times daily, before meals and at bedtime 200 each 5    Insulin Syringe-Needle U-100 31G X 5/16" 0.5 ML MISC Inject under the skin 4 (four) times a day 200 each 2    Lancets (OneTouch Delica Plus [id number]) MISC USE 1  TO CHECK GLUCOSE 4 TIMES DAILY 400 each 0    lisinopril (ZESTRIL) 5 mg tablet Take 1 tablet (5 mg total) by mouth daily 90 tablet 0    omeprazole (PriLOSEC) 40 MG capsule Take 1 capsule by mouth once daily 90 capsule 3    polyethylene glycol (GLYCOLAX) 17 GM/SCOOP powder TAKE 17 GRAMS BY MOUTH DAILY 510 g 0    rosuvastatin (CRESTOR) 5 mg tablet TAKE 1 TABLET BY MOUTH ONCE DAILY AT BEDTIME 90 tablet 0    tadalafil (CIALIS) 20 MG tablet TAKE 1 TABLET BY MOUTH ONCE DAILY AS NEEDED ONE HOUR PRIOR TO SEXUAL ACTIVITY 60 tablet 0     No current facility-administered medications for this visit. Allergies:     No Known Allergies   Physical Exam:     /83 (BP Location: Left arm, Patient Position: Sitting, Cuff Size: Large)   Pulse 81   Temp 98.1 °F (36.7 °C) (Temporal)   Ht 5' 7" (1.702 m)   Wt 99.3 kg (219 lb)   BMI 34.30 kg/m²     Physical Exam  Vitals and nursing note reviewed. Constitutional:       General: He is awake. He is not in acute distress. Appearance: Normal appearance. He is well-developed and well-groomed. He is obese. He is not ill-appearing. HENT:      Head: Normocephalic and atraumatic. Right Ear: Tympanic membrane, ear canal and external ear normal.      Left Ear: Tympanic membrane, ear canal and external ear normal.      Nose: Nose normal. No congestion or rhinorrhea. Mouth/Throat:      Mouth: Mucous membranes are moist.      Pharynx: Oropharynx is clear. No oropharyngeal exudate or posterior oropharyngeal erythema. Eyes:      General: No scleral icterus. Extraocular Movements: Extraocular movements intact. Conjunctiva/sclera: Conjunctivae normal.      Pupils: Pupils are equal, round, and reactive to light. Cardiovascular:      Rate and Rhythm: Normal rate and regular rhythm. Pulses: Normal pulses.            Radial pulses are 2+ on the right side and 2+ on the left side. Heart sounds: Normal heart sounds. No murmur heard. Pulmonary:      Effort: Pulmonary effort is normal. No respiratory distress. Breath sounds: Normal breath sounds and air entry. No decreased air movement. No decreased breath sounds, wheezing, rhonchi or rales. Abdominal:      General: Abdomen is flat. Bowel sounds are normal. There is no distension. Palpations: Abdomen is soft. There is no mass. Tenderness: There is no abdominal tenderness. There is no right CVA tenderness, left CVA tenderness, guarding or rebound. Hernia: No hernia is present. Musculoskeletal:         General: Normal range of motion. Cervical back: Neck supple. Right lower leg: No edema. Left lower leg: No edema. Lymphadenopathy:      Cervical: No cervical adenopathy. Skin:     General: Skin is warm. Capillary Refill: Capillary refill takes less than 2 seconds. Coloration: Skin is not jaundiced. Findings: No rash. Neurological:      General: No focal deficit present. Mental Status: He is alert and oriented to person, place, and time. Mental status is at baseline. Coordination: Coordination is intact. Gait: Gait is intact. Psychiatric:         Attention and Perception: Attention normal.         Mood and Affect: Mood and affect normal.         Speech: Speech normal.         Behavior: Behavior normal. Behavior is cooperative.           Aamir Orozco, [address]

## 2023-11-17 PROBLEM — Z00.00 ANNUAL PHYSICAL EXAM: Status: ACTIVE | Noted: 2023-11-17

## 2023-11-17 PROBLEM — J30.9 ALLERGIC RHINITIS DUE TO ALLERGEN: Status: ACTIVE | Noted: 2023-11-17

## 2023-11-17 PROBLEM — E78.2 MIXED HYPERLIPIDEMIA: Status: ACTIVE | Noted: 2023-11-17

## 2023-11-17 LAB — SL AMB POCT HEMOGLOBIN AIC: 6.6 (ref ?–6.5)

## 2023-11-17 NOTE — ASSESSMENT & PLAN NOTE
BP Readings from Last 3 Encounters:   11/16/23 127/83   05/28/23 127/86   01/17/23 119/79      Continue lisinopril 5 mg daily. Limit sodium and salt intake. Avoid alcohol and caffeine.

## 2023-11-17 NOTE — ASSESSMENT & PLAN NOTE
Lab Results   Component Value Date    CHOLESTEROL 164 09/06/2022    CHOLESTEROL 181 12/22/2020     Lab Results   Component Value Date    HDL 34 (L) 09/06/2022    HDL 44 12/22/2020    HDL 40 06/01/2015     Lab Results   Component Value Date    TRIG 71 09/06/2022    TRIG 108 12/22/2020    TRIG 119 06/01/2015     Lab Results   Component Value Date    NONHDLC 137 12/22/2020      Lab Results   Component Value Date    LDLCALC 116 (H) 09/06/2022      Continue rosuvastatin 5 mg daily at bedtime. Low fat diet. Will recheck lipid panel.

## 2023-11-17 NOTE — ASSESSMENT & PLAN NOTE
Lab Results   Component Value Date    HGBA1C 6.6 (A) 11/17/2023     Increased to 6.6% from 5.9%. Patient has been nonadherent with his insulin regimen and nutrition. He often changes the units of insulin for both Lantus and Humalog. He often "fasts" or skips meals. He will skip his insulin when he does this. He states he frequently uses [employer] and based off what it says will change his insulin dosing and his eating habits and routine. He also does not check his sugar often. Discussed importance of monitoring glucose to monitor for periods of hyperglycemia or hypoglycemia. Try to check blood sugar 3-4 times daily. Record in log and bring to next visit. Discussed caution with insulin and not eating properly or balanced throughout the day. Patient understood. Continue Lantus 45 units every night. Continue Humalog 10 units with breakfast and lunch, 15 units with dinner. We did discuss meeting with the diabetes educator to teach him more about insulin and nutrition. He was agreeable. Referral placed. Declines endo referral although placed previously. Reminded to schedule eye exam. Declined foot exam today. He states his neuropathy has been well controlled with gabapentin 600 mg TID. Also states he uses medical marijuana prn. Due for urine micro. Follow up in 3 months, sooner if needed.

## 2023-11-17 NOTE — ASSESSMENT & PLAN NOTE
Discussed general health recommendations and screening guidelines. Agreeable to screening lab work. Recommend routine dental and eye exams. Agreeable to influenza and [id number] immunizations today. Tolerated well. Next physical one year.

## 2023-12-22 DIAGNOSIS — E10.42 TYPE 1 DIABETES MELLITUS WITH DIABETIC POLYNEUROPATHY (HCC): ICD-10-CM

## 2023-12-26 RX ORDER — INSULIN LISPRO 100 [IU]/ML
INJECTION, SOLUTION INTRAVENOUS; SUBCUTANEOUS
Qty: 15 ML | Refills: 0 | Status: SHIPPED | OUTPATIENT
Start: 2023-12-26

## 2024-01-11 DIAGNOSIS — N52.9 ERECTILE DYSFUNCTION, UNSPECIFIED ERECTILE DYSFUNCTION TYPE: ICD-10-CM

## 2024-01-11 RX ORDER — TADALAFIL 20 MG/1
TABLET ORAL
Qty: 60 TABLET | Refills: 0 | Status: SHIPPED | OUTPATIENT
Start: 2024-01-11

## 2024-01-15 DIAGNOSIS — E10.42 TYPE 1 DIABETES MELLITUS WITH DIABETIC POLYNEUROPATHY (HCC): ICD-10-CM

## 2024-01-15 RX ORDER — INSULIN LISPRO 100 [IU]/ML
INJECTION, SOLUTION INTRAVENOUS; SUBCUTANEOUS
Qty: 10 ML | Refills: 5 | Status: SHIPPED | OUTPATIENT
Start: 2024-01-15

## 2024-02-21 DIAGNOSIS — E10.42 TYPE 1 DIABETES MELLITUS WITH DIABETIC POLYNEUROPATHY (HCC): ICD-10-CM

## 2024-02-23 RX ORDER — INSULIN LISPRO 100 [IU]/ML
INJECTION, SOLUTION INTRAVENOUS; SUBCUTANEOUS
Qty: 10 ML | Refills: 1 | Status: SHIPPED | OUTPATIENT
Start: 2024-02-23

## 2024-03-15 DIAGNOSIS — N52.9 ERECTILE DYSFUNCTION, UNSPECIFIED ERECTILE DYSFUNCTION TYPE: ICD-10-CM

## 2024-03-15 DIAGNOSIS — K21.9 GASTROESOPHAGEAL REFLUX DISEASE: ICD-10-CM

## 2024-03-15 DIAGNOSIS — J30.89 NON-SEASONAL ALLERGIC RHINITIS DUE TO OTHER ALLERGIC TRIGGER: ICD-10-CM

## 2024-03-15 RX ORDER — TADALAFIL 20 MG/1
TABLET ORAL
Qty: 60 TABLET | Refills: 3 | Status: SHIPPED | OUTPATIENT
Start: 2024-03-15

## 2024-03-15 RX ORDER — OMEPRAZOLE 40 MG/1
40 CAPSULE, DELAYED RELEASE ORAL DAILY
Qty: 90 CAPSULE | Refills: 3 | Status: SHIPPED | OUTPATIENT
Start: 2024-03-15

## 2024-03-15 RX ORDER — MONTELUKAST SODIUM 10 MG/1
10 TABLET ORAL
Qty: 90 TABLET | Refills: 3 | Status: SHIPPED | OUTPATIENT
Start: 2024-03-15

## 2024-05-15 ENCOUNTER — TELEPHONE (OUTPATIENT)
Dept: INTERNAL MEDICINE CLINIC | Facility: CLINIC | Age: 39
End: 2024-05-15

## 2024-05-15 NOTE — TELEPHONE ENCOUNTER
----- Message from Christina Bernardo PA-C sent at 5/14/2024  5:13 PM EDT -----  Please schedule patient. He is due for his diabetes follow up. Thank you

## 2024-05-21 ENCOUNTER — OFFICE VISIT (OUTPATIENT)
Dept: URGENT CARE | Age: 39
End: 2024-05-21
Payer: COMMERCIAL

## 2024-05-21 DIAGNOSIS — J30.1 ALLERGIC RHINITIS DUE TO POLLEN, UNSPECIFIED SEASONALITY: Primary | ICD-10-CM

## 2024-05-21 DIAGNOSIS — J45.21 MILD INTERMITTENT EXTRINSIC ASTHMA WITH ACUTE EXACERBATION: ICD-10-CM

## 2024-05-21 PROCEDURE — 99214 OFFICE O/P EST MOD 30 MIN: CPT | Performed by: STUDENT IN AN ORGANIZED HEALTH CARE EDUCATION/TRAINING PROGRAM

## 2024-05-21 RX ORDER — ALBUTEROL SULFATE 90 UG/1
2 AEROSOL, METERED RESPIRATORY (INHALATION) EVERY 6 HOURS PRN
Qty: 8.5 G | Refills: 0 | Status: SHIPPED | OUTPATIENT
Start: 2024-05-21

## 2024-05-21 RX ORDER — AZELASTINE 1 MG/ML
1 SPRAY, METERED NASAL 2 TIMES DAILY
Qty: 30 ML | Refills: 0 | Status: SHIPPED | OUTPATIENT
Start: 2024-05-21

## 2024-05-21 RX ORDER — LEVOCETIRIZINE DIHYDROCHLORIDE 5 MG/1
5 TABLET, FILM COATED ORAL EVERY EVENING
Qty: 30 TABLET | Refills: 0 | Status: SHIPPED | OUTPATIENT
Start: 2024-05-21 | End: 2024-06-20

## 2024-05-21 NOTE — LETTER
May 21, 2024     Patient: George Weber   YOB: 1985   Date of Visit: 5/21/2024       To Whom It May Concern:    It is my medical opinion that George Weber may return to work on 05/22/2024 .    If you have any questions or concerns, please don't hesitate to call.         Sincerely,        Ginger Quiroga PA-C    CC: No Recipients

## 2024-05-21 NOTE — PATIENT INSTRUCTIONS
Take Xyzal as prescribed.  Continue Flonase.  Astelin as needed for the next 1 to 4 weeks.  Naphcon-A or Pataday drops over-the-counter as needed for eye itching and watering.  With PCP if symptoms or not improved in the next 3 to 5 days.  If symptoms worsen or new symptoms develop such as fever, chills, shortness of breath, or chest pain report to the emergency room immediately.

## 2024-05-21 NOTE — PROGRESS NOTES
[facility]        NAME: George Weber is a 38 y.o. male  : 1985    MRN: [id number]  DATE: May 21, 2024  TIME: 9:33 AM    Assessment and Plan   Allergic rhinitis due to pollen, unspecified seasonality [J30.1]  1. Allergic rhinitis due to pollen, unspecified seasonality  levocetirizine (XYZAL) 5 MG tablet    azelastine (ASTELIN) 0.1 % nasal spray      2. Mild intermittent extrinsic asthma with acute exacerbation  albuterol (ProAir HFA) 90 mcg/act inhaler      Presents with symptoms and examination consistent with allergic rhinitis, instructed to continue Flonase he was provided with a prescription for Xyzal and Astelin.  Cough is consistent with allergic asthma.  Recommend albuterol inhaler to be used as needed for coughing fits.      Patient Instructions     Patient Instructions   Take Xyzal as prescribed.  Continue Flonase.  Astelin as needed for the next 1 to 4 weeks.  Naphcon-A or Pataday drops over-the-counter as needed for eye itching and watering.  With PCP if symptoms or not improved in the next 3 to 5 days.  If symptoms worsen or new symptoms develop such as fever, chills, shortness of breath, or chest pain report to the emergency room immediately.      Follow up with PCP in 3-5 days.  Proceed to  ER if symptoms worsen.    If tests have been performed at [facility], our office will contact you with results if changes need to be made to the care plan discussed with you at the visit. You can [facility] on [facility].     Chief Complaint     Chief Complaint   Patient presents with    Cough     Patient reports nasal congestion intermittently all year around takes zyrtec and Flonase for symptoms . At times uses benadryl. Cough has increased over the last week, slightly productive. No fevers reported. B/l eyes itching and watering. Sob    Nasal Congestion    Eye Problem         History of Present Illness       38-year-old male presents complaint of runny nose, sinus congestion,  itchy, watery eyes, and cough.  Patient notes significant postnasal drip as well.  He states that symptoms have been worse over the last week.  Patient notes a history of chronic allergic rhinitis and typically takes Zyrtec for this.  He states that he has not been getting relief from his symptoms without taking Benadryl which is making him too tired to function.  Patient believes he had a sinus infection about a month ago that he did recover from without antibiotics but he is concerned that it is recurring.  Denies any shortness of breath or chest pain with the cough.    Cough  Associated symptoms include eye redness, postnasal drip and rhinorrhea. Pertinent negatives include no chest pain, chills or fever.   Eye Problem   Associated symptoms include an eye discharge, eye redness and itching. Pertinent negatives include no fever or photophobia.       Review of Systems   Review of Systems   Constitutional:  Positive for fatigue. Negative for chills and fever.   HENT:  Positive for congestion, postnasal drip, rhinorrhea and sneezing.    Eyes:  Positive for discharge, redness and itching. Negative for photophobia, pain and visual disturbance.   Respiratory:  Positive for cough.    Cardiovascular:  Negative for chest pain.         Current Medications       Current Outpatient Medications:     albuterol (ProAir HFA) 90 mcg/act inhaler, Inhale 2 puffs every 6 (six) hours as needed for wheezing, Disp: 8.5 g, Rfl: 0    azelastine (ASTELIN) 0.1 % nasal spray, 1 spray into each nostril 2 (two) times a day Use in each nostril as directed, Disp: 30 mL, Rfl: 0    B-D ULTRAFINE III SHORT PEN 31G X 8 MM MISC, USE 1 PEN NEEDLE 4 TIMES DAILY, BEFORE MEAL(S) AND AT BEDTIME, Disp: 100 each, Rfl: 0    Blood Glucose Calibration (ACCU-CHEK SYLVESTER) SOLN, by In Vitro route, Disp: , Rfl:     Blood Glucose Monitoring Suppl (OneTouch Verio) w/Device KIT, Use 4 (four) times a day, Disp: 1 kit, Rfl: 0    Blood Pressure Monitor KIT, Use daily,  "Disp: 1 kit, Rfl: 0    FLUoxetine (PROzac) 40 MG capsule, Take 1 capsule by mouth once daily, Disp: 90 capsule, Rfl: 0    gabapentin (NEURONTIN) 300 mg capsule, Take 2 capsules (600 mg total) by mouth 3 (three) times a day, Disp: 540 capsule, Rfl: 2    Glucagon HCl 1 MG SOLR, Inject 1 mg under the skin, Disp: , Rfl:     glucose blood test strip, Measure blood glucose 3 times daily - before meals and at bedtime., Disp: 200 each, Rfl: 2    Insulin Glargine-yfgn 100 UNIT/ML SOPN, INJECT 45 UNITS SUBCUTANEOUSLY ONCE DAILY AT BEDTIME, Disp: 15 mL, Rfl: 3    Insulin Lispro (HumaLOG) 100 units/mL cartridge for injection, INJECT 10 UNITS WITH BREAKFAST, 10 UNITS WITH LUNCH, 15 UNITS WITH SUPPER, Disp: 10 mL, Rfl: 1    Insulin Pen Needle (PEN NEEDLES 29GX1/2\") 29G X 12MM MISC, Use with insulin four times daily, before meals and at bedtime, Disp: 200 each, Rfl: 5    Insulin Syringe-Needle U-100 31G X 5/16\" 0.5 ML MISC, Inject under the skin 4 (four) times a day, Disp: 200 each, Rfl: 2    Lancets (OneTouch Delica Plus [id number]) MISC, USE 1  TO CHECK GLUCOSE 4 TIMES DAILY, Disp: 400 each, Rfl: 0    Lancets Ultra Thin 30G MISC, Use to check sugar three times daily, Disp: 200 each, Rfl: 2    levocetirizine (XYZAL) 5 MG tablet, Take 1 tablet (5 mg total) by mouth every evening, Disp: 30 tablet, Rfl: 0    lisinopril (ZESTRIL) 5 mg tablet, Take 1 tablet (5 mg total) by mouth daily, Disp: 90 tablet, Rfl: 0    montelukast (SINGULAIR) 10 mg tablet, Take 1 tablet (10 mg total) by mouth daily at bedtime, Disp: 90 tablet, Rfl: 3    omeprazole (PriLOSEC) 40 MG capsule, Take 1 capsule (40 mg total) by mouth daily, Disp: 90 capsule, Rfl: 3    polyethylene glycol (GLYCOLAX) 17 GM/SCOOP powder, TAKE 17 GRAMS BY MOUTH DAILY, Disp: 510 g, Rfl: 0    rosuvastatin (CRESTOR) 5 mg tablet, TAKE 1 TABLET BY MOUTH ONCE DAILY AT BEDTIME, Disp: 90 tablet, Rfl: 0    tadalafil (CIALIS) 20 MG tablet, TAKE 1 TABLET BY MOUTH ONCE DAILY AS NEEDED ONE HOUR " PRIOR TO SEXUAL ACTIVITY, Disp: 60 tablet, Rfl: 3    Current Allergies     Allergies as of 05/21/2024    (No Known Allergies)            The following portions of the patient's history were reviewed and updated as appropriate: allergies, current medications, past family history, past medical history, past social history, past surgical history and problem list.     Past Medical History:   Diagnosis Date    Attention deficit hyperactivity disorder (ADHD), combined type 10/05/2015    Diabetes mellitus (HCC)        No past surgical history on file.    Family History   Problem Relation Age of Onset    Diabetes Mother     Stroke Maternal Grandfather          Medications have been verified.        Objective   There were no vitals taken for this visit.  No LMP for male patient.       Physical Exam     Physical Exam  Vitals and nursing note reviewed.   Constitutional:       General: He is not in acute distress.     Appearance: Normal appearance. He is not ill-appearing or toxic-appearing.   HENT:      Head: Normocephalic and atraumatic.      Jaw: No trismus.      Right Ear: Tympanic membrane, ear canal and external ear normal. There is no impacted cerumen. No foreign body.      Left Ear: Tympanic membrane, ear canal and external ear normal. There is no impacted cerumen. No foreign body.      Nose: Mucosal edema, congestion and rhinorrhea present. No nasal deformity. Rhinorrhea is clear.      Right Nostril: No foreign body, epistaxis or occlusion.      Left Nostril: No foreign body, epistaxis or occlusion.      Right Turbinates: Enlarged and swollen. Not pale.      Left Turbinates: Enlarged and swollen. Not pale.      Comments: Swollen boggy turbinates with bluish discoloration     Mouth/Throat:      Lips: Pink. No lesions.      Mouth: Mucous membranes are moist. No injury, oral lesions or angioedema.      Dentition: Normal dentition.      Tongue: No lesions. Tongue does not deviate from midline.      Palate: No mass and  "lesions.      Pharynx: Uvula midline. No pharyngeal swelling, oropharyngeal exudate, posterior oropharyngeal erythema or uvula swelling.      Tonsils: No tonsillar exudate or tonsillar abscesses.      Comments: Copious postnasal drip with cobblestoning the posterior pharynx noted  Eyes:      General: Lids are normal. Gaze aligned appropriately. No allergic shiner.     Extraocular Movements: Extraocular movements intact.   Cardiovascular:      Rate and Rhythm: Normal rate.   Pulmonary:      Effort: Pulmonary effort is normal.      Comments: Patient speaking in full sentences with no increased respiratory effort.   No audible wheezing or stridor.   Lymphadenopathy:      Cervical: No cervical adenopathy.   Skin:     General: Skin is warm and dry.   Neurological:      Mental Status: He is alert and oriented to person, place, and time.      Coordination: Coordination is intact.      Gait: Gait is intact.   Psychiatric:         Attention and Perception: Attention and perception normal.         Mood and Affect: Mood and affect normal.         Speech: Speech normal.         Behavior: Behavior is cooperative.               Note: Portions of this record may have been created with voice recognition software. Occasional wrong word or \"sound a like\" substitutions may have occurred due to the inherent limitations of voice recognition software. Please read the chart carefully and recognize, using context, where substitutions have occurred.*      "

## 2024-05-24 DIAGNOSIS — E10.42 TYPE 1 DIABETES MELLITUS WITH DIABETIC POLYNEUROPATHY (HCC): ICD-10-CM

## 2024-05-24 DIAGNOSIS — J30.89 NON-SEASONAL ALLERGIC RHINITIS DUE TO OTHER ALLERGIC TRIGGER: ICD-10-CM

## 2024-05-24 DIAGNOSIS — N52.9 ERECTILE DYSFUNCTION, UNSPECIFIED ERECTILE DYSFUNCTION TYPE: ICD-10-CM

## 2024-05-24 RX ORDER — GABAPENTIN 300 MG/1
600 CAPSULE ORAL 3 TIMES DAILY
Qty: 540 CAPSULE | Refills: 0 | Status: SHIPPED | OUTPATIENT
Start: 2024-05-24 | End: 2024-08-22

## 2024-05-24 RX ORDER — INSULIN LISPRO 100 [IU]/ML
INJECTION, SOLUTION INTRAVENOUS; SUBCUTANEOUS
Qty: 10 ML | Refills: 2 | Status: SHIPPED | OUTPATIENT
Start: 2024-05-24 | End: 2024-05-24 | Stop reason: ALTCHOICE

## 2024-05-24 RX ORDER — INSULIN GLARGINE-YFGN 100 [IU]/ML
45 INJECTION, SOLUTION SUBCUTANEOUS
Qty: 15 ML | Refills: 2 | Status: SHIPPED | OUTPATIENT
Start: 2024-05-24

## 2024-05-24 RX ORDER — TADALAFIL 20 MG/1
TABLET ORAL
Qty: 60 TABLET | Refills: 0 | Status: SHIPPED | OUTPATIENT
Start: 2024-05-24

## 2024-05-24 RX ORDER — INSULIN LISPRO 100 [IU]/ML
INJECTION, SOLUTION INTRAVENOUS; SUBCUTANEOUS
Qty: 10 ML | Refills: 2 | Status: SHIPPED | OUTPATIENT
Start: 2024-05-24

## 2024-05-24 RX ORDER — MONTELUKAST SODIUM 10 MG/1
10 TABLET ORAL
Qty: 90 TABLET | Refills: 0 | Status: SHIPPED | OUTPATIENT
Start: 2024-05-24

## 2024-06-21 DIAGNOSIS — E10.42 TYPE 1 DIABETES MELLITUS WITH DIABETIC POLYNEUROPATHY (HCC): ICD-10-CM

## 2024-06-21 RX ORDER — SYRINGE-NEEDLE,INSULIN,0.5 ML 27GX1/2"
SYRINGE, EMPTY DISPOSABLE MISCELLANEOUS
Qty: 200 EACH | Refills: 2 | Status: SHIPPED | OUTPATIENT
Start: 2024-06-21

## 2024-06-21 RX ORDER — INSULIN LISPRO 100 [IU]/ML
INJECTION, SOLUTION INTRAVENOUS; SUBCUTANEOUS
Qty: 10 ML | Refills: 3 | Status: SHIPPED | OUTPATIENT
Start: 2024-06-21

## 2024-07-06 NOTE — PATIENT INSTRUCTIONS
"Daily Call Note: Daily call completed with pt this morning.   /70   Pulse 85   Wt 74.1 kg (163 lb 6.4 oz)   BMI 25.59 kg/m²   No c/o dizziness, lightheadedness, etc. Today. Pt states that he feels \"great.\"  Secure chat sent to Dr. Carmichael re: BP today and that pt has been holding the second dose of Entresto - taking it only once daily. Dr. Carmichael Ok'd this - but does want pt to discuss with his cardiologist when he sees him on the 11th. RTC to pt with this information; he verbalized understanding.  Next [facility] scheduled for 7/12 at 1100.  No other questions or concerns at this time.    Pt Education: per POC  Barriers: none  Topics for Daily Review: BP  Pt demonstrates clear understanding: Yes    Daily Weight:  Vitals:    07/06/24 0830   Weight: 74.1 kg (163 lb 6.4 oz)      Last 3 Weights:  Wt Readings from Last 7 Encounters:   07/06/24 74.1 kg (163 lb 6.4 oz)   07/05/24 74.4 kg (164 lb)   07/04/24 75.3 kg (166 lb)   07/03/24 73.5 kg (162 lb)   06/28/24 76.5 kg (168 lb 9.6 oz)   06/27/24 74.8 kg (165 lb)   06/19/24 73.2 kg (161 lb 6 oz)       Masimo Device: No   Masimo Clinical Impression: n/a    Virtual Visits--Scheduled (Most Recent Date at Top)  Follow up Appointments  Recent Visits  Date Type Provider Dept   07/05/24 Telemedicine Josefina Baldwin MD [facility]   Showing recent visits within past 30 days and meeting all other requirements  Future Appointments  Date Type Provider Dept   07/12/24 Appointment [facility]   Showing future appointments within next 90 days and meeting all other requirements       Frequency of RN Calls & Virtual Visits per Team Agreement: [facility] Frequency: Daily    Medication issues Addressed (what was done): Entresto 24/26mg - take daily (not twice daily)    Follow up appointments scheduled by [facility] Staff: none  Referrals made by [facility] staff: none        " You have been prescribed antibiotics and steroids - take as directed  Continue current allergy medicine regimen  Follow-up with your PCP  Go to the ED for any severely worsening symptoms

## 2024-07-16 ENCOUNTER — TELEPHONE (OUTPATIENT)
Dept: INTERNAL MEDICINE CLINIC | Facility: CLINIC | Age: 39
End: 2024-07-16

## 2024-07-18 ENCOUNTER — OFFICE VISIT (OUTPATIENT)
Dept: URGENT CARE | Age: 39
End: 2024-07-18
Payer: COMMERCIAL

## 2024-07-18 VITALS
RESPIRATION RATE: 20 BRPM | HEART RATE: 107 BPM | SYSTOLIC BLOOD PRESSURE: 122 MMHG | OXYGEN SATURATION: 99 % | BODY MASS INDEX: 34.19 KG/M2 | WEIGHT: 218.3 LBS | DIASTOLIC BLOOD PRESSURE: 70 MMHG | TEMPERATURE: 98 F

## 2024-07-18 DIAGNOSIS — J01.41 ACUTE RECURRENT PANSINUSITIS: Primary | ICD-10-CM

## 2024-07-18 DIAGNOSIS — N52.9 ERECTILE DYSFUNCTION, UNSPECIFIED ERECTILE DYSFUNCTION TYPE: ICD-10-CM

## 2024-07-18 PROCEDURE — 99213 OFFICE O/P EST LOW 20 MIN: CPT

## 2024-07-18 RX ORDER — AMOXICILLIN AND CLAVULANATE POTASSIUM 875; 125 MG/1; MG/1
1 TABLET, FILM COATED ORAL EVERY 12 HOURS SCHEDULED
Qty: 10 TABLET | Refills: 0 | Status: SHIPPED | OUTPATIENT
Start: 2024-07-18 | End: 2024-07-23

## 2024-07-18 NOTE — LETTER
July 18, 2024     Patient: George Weber   YOB: 1985   Date of Visit: 7/18/2024       To Whom it May Concern:    George Weber was seen in my clinic on 7/18/2024. Please excuse his tardiness.   If you have any questions or concerns, please don't hesitate to call.         Sincerely,          EVA Merritt        CC: No Recipients

## 2024-07-18 NOTE — PATIENT INSTRUCTIONS
Please take Augmentin 2x daily for 5 days.  Follow up with PCP in 3-5 days.  Proceed to  ER if symptoms worsen.    If tests are performed, our office will contact you with results only if changes need to made to the care plan discussed with you at the visit. You can review your full results on [facility].

## 2024-07-18 NOTE — PROGRESS NOTES
[facility]        NAME: George Weber is a 39 y.o. male  : 1985    MRN: [id number]  DATE: 2024  TIME: 3:33 PM    Assessment and Plan   Acute recurrent pansinusitis [J01.41]  1. Acute recurrent pansinusitis  amoxicillin-clavulanate (AUGMENTIN) 875-125 mg per tablet            Patient Instructions     Please take Augmentin 2x daily for 5 days.  Follow up with PCP in 3-5 days.  Proceed to  ER if symptoms worsen.    If tests are performed, our office will contact you with results only if changes need to made to the care plan discussed with you at the visit. You can review your full results on [facility].    Chief Complaint     Chief Complaint   Patient presents with    Nasal Congestion     Reports has seasonal allergy with intermittent congestion , Nasal congestion has increased over the past 3 weeks with intermittent bloody nose. Denies fever or chills. Denies cough. R ear pressure which has resolved. Took ibuprofen for pain.     Nose Bleed         History of Present Illness       39-year-old male presenting for evaluation of sinusitis.  Patient states over the past 3 weeks he has been experiencing increased sinus pain and pressure associated with intermittent nosebleeds, postnasal drip and headache.  He has been taking Claritin, Benadryl and Singulair with minimal relief of his symptoms.  He denies any fevers, chills, chest pain or shortness of breath.    Nose Bleed         Review of Systems   Review of Systems   Constitutional:  Negative for chills and fever.   HENT:  Positive for nosebleeds, postnasal drip, sinus pressure and sinus pain.    Respiratory:  Negative for shortness of breath.    Cardiovascular:  Negative for chest pain.   Neurological:  Positive for headaches.   All other systems reviewed and are negative.        Current Medications       Current Outpatient Medications:     albuterol (ProAir HFA) 90 mcg/act inhaler, Inhale 2 puffs every 6 (six) hours as needed for  "wheezing, Disp: 8.5 g, Rfl: 0    amoxicillin-clavulanate (AUGMENTIN) 875-125 mg per tablet, Take 1 tablet by mouth every 12 (twelve) hours for 5 days, Disp: 10 tablet, Rfl: 0    azelastine (ASTELIN) 0.1 % nasal spray, 1 spray into each nostril 2 (two) times a day Use in each nostril as directed, Disp: 30 mL, Rfl: 0    B-D ULTRAFINE III SHORT PEN 31G X 8 MM MISC, USE 1 PEN NEEDLE 4 TIMES DAILY, BEFORE MEAL(S) AND AT BEDTIME, Disp: 100 each, Rfl: 0    Blood Glucose Calibration (ACCU-CHEK SYLVESTER) SOLN, by In Vitro route, Disp: , Rfl:     Blood Glucose Monitoring Suppl (OneTouch Verio) w/Device KIT, Use 4 (four) times a day, Disp: 1 kit, Rfl: 0    Blood Pressure Monitor KIT, Use daily, Disp: 1 kit, Rfl: 0    FLUoxetine (PROzac) 40 MG capsule, Take 1 capsule by mouth once daily, Disp: 90 capsule, Rfl: 0    gabapentin (NEURONTIN) 300 mg capsule, Take 2 capsules (600 mg total) by mouth 3 (three) times a day, Disp: 540 capsule, Rfl: 0    Glucagon HCl 1 MG SOLR, Inject 1 mg under the skin, Disp: , Rfl:     glucose blood test strip, Measure blood glucose 3 times daily - before meals and at bedtime., Disp: 200 each, Rfl: 2    Insulin Glargine-yfgn 100 UNIT/ML SOPN, Inject 0.45 mL (45 Units total) under the skin daily at bedtime, Disp: 15 mL, Rfl: 2    insulin lispro (HumaLOG) 100 units/mL injection, INJECT 10 UNITS WITH BREAKFAST, 10 UNITS WITH LUNCH, 15 UNITS WITH SUPPER, Disp: 10 mL, Rfl: 3    Insulin Pen Needle (PEN NEEDLES 29GX1/2\") 29G X 12MM MISC, Use with insulin four times daily, before meals and at bedtime, Disp: 200 each, Rfl: 5    Insulin Syringe-Needle U-100 31G X 5/16\" 0.5 ML MISC, Inject under the skin 4 (four) times a day, Disp: 200 each, Rfl: 2    Lancets (OneTouch Delica Plus [id number]) MISC, USE 1  TO CHECK GLUCOSE 4 TIMES DAILY, Disp: 400 each, Rfl: 0    Lancets Ultra Thin 30G MISC, Use to check sugar three times daily, Disp: 200 each, Rfl: 2    levocetirizine (XYZAL) 5 MG tablet, Take 1 tablet (5 mg total) " by mouth every evening, Disp: 30 tablet, Rfl: 0    montelukast (SINGULAIR) 10 mg tablet, Take 1 tablet (10 mg total) by mouth daily at bedtime, Disp: 90 tablet, Rfl: 0    omeprazole (PriLOSEC) 40 MG capsule, Take 1 capsule (40 mg total) by mouth daily, Disp: 90 capsule, Rfl: 3    polyethylene glycol (GLYCOLAX) 17 GM/SCOOP powder, TAKE 17 GRAMS BY MOUTH DAILY, Disp: 510 g, Rfl: 0    rosuvastatin (CRESTOR) 5 mg tablet, TAKE 1 TABLET BY MOUTH ONCE DAILY AT BEDTIME, Disp: 90 tablet, Rfl: 0    tadalafil (CIALIS) 20 MG tablet, TAKE 1 TABLET BY MOUTH ONCE DAILY AS NEEDED ONE HOUR PRIOR TO SEXUAL ACTIVITY, Disp: 60 tablet, Rfl: 0    lisinopril (ZESTRIL) 5 mg tablet, Take 1 tablet (5 mg total) by mouth daily, Disp: 90 tablet, Rfl: 0    Current Allergies     Allergies as of 07/18/2024    (No Known Allergies)            The following portions of the patient's history were reviewed and updated as appropriate: allergies, current medications, past family history, past medical history, past social history, past surgical history and problem list.     Past Medical History:   Diagnosis Date    Attention deficit hyperactivity disorder (ADHD), combined type 10/05/2015    Diabetes mellitus (HCC)        History reviewed. No pertinent surgical history.    Family History   Problem Relation Age of Onset    Diabetes Mother     Stroke Maternal Grandfather          Medications have been verified.        Objective   /70   Pulse (!) 107   Temp 98 °F (36.7 °C) (Tympanic)   Resp 20   Wt 99 kg (218 lb 4.8 oz)   SpO2 99%   BMI 34.19 kg/m²        Physical Exam     Physical Exam  Vitals and nursing note reviewed.   Constitutional:       General: He is not in acute distress.     Appearance: Normal appearance. He is normal weight. He is not ill-appearing.   HENT:      Head: Normocephalic and atraumatic.      Right Ear: Tympanic membrane normal.      Left Ear: Tympanic membrane normal.      Nose: No congestion or rhinorrhea.      Right Sinus:  Maxillary sinus tenderness and frontal sinus tenderness present.      Left Sinus: No maxillary sinus tenderness or frontal sinus tenderness.      Mouth/Throat:      Mouth: Mucous membranes are moist.      Pharynx: Oropharynx is clear. No oropharyngeal exudate or posterior oropharyngeal erythema.   Eyes:      General:         Right eye: No discharge.         Left eye: No discharge.   Cardiovascular:      Rate and Rhythm: Normal rate.      Pulses: Normal pulses.   Pulmonary:      Effort: Pulmonary effort is normal.   Skin:     General: Skin is warm and dry.   Neurological:      Mental Status: He is alert.   Psychiatric:         Mood and Affect: Mood normal.         Behavior: Behavior normal.

## 2024-07-19 RX ORDER — TADALAFIL 20 MG/1
TABLET ORAL
Qty: 60 TABLET | Refills: 0 | Status: SHIPPED | OUTPATIENT
Start: 2024-07-19

## 2024-07-30 ENCOUNTER — TELEPHONE (OUTPATIENT)
Dept: INTERNAL MEDICINE CLINIC | Facility: CLINIC | Age: 39
End: 2024-07-30

## 2024-08-12 ENCOUNTER — OFFICE VISIT (OUTPATIENT)
Dept: URGENT CARE | Age: 39
End: 2024-08-12
Payer: COMMERCIAL

## 2024-08-12 VITALS
SYSTOLIC BLOOD PRESSURE: 122 MMHG | OXYGEN SATURATION: 97 % | DIASTOLIC BLOOD PRESSURE: 70 MMHG | TEMPERATURE: 98.1 F | HEART RATE: 93 BPM | RESPIRATION RATE: 20 BRPM

## 2024-08-12 DIAGNOSIS — K21.9 GASTROESOPHAGEAL REFLUX DISEASE WITHOUT ESOPHAGITIS: Primary | ICD-10-CM

## 2024-08-12 DIAGNOSIS — K59.09 OTHER CONSTIPATION: ICD-10-CM

## 2024-08-12 PROCEDURE — 99213 OFFICE O/P EST LOW 20 MIN: CPT | Performed by: STUDENT IN AN ORGANIZED HEALTH CARE EDUCATION/TRAINING PROGRAM

## 2024-08-12 RX ORDER — FAMOTIDINE 20 MG/1
20 TABLET, FILM COATED ORAL 2 TIMES DAILY
Qty: 60 TABLET | Refills: 0 | Status: SHIPPED | OUTPATIENT
Start: 2024-08-12 | End: 2024-09-11

## 2024-08-12 RX ORDER — ALUMINA, MAGNESIA, AND SIMETHICONE 2400; 2400; 240 MG/30ML; MG/30ML; MG/30ML
10 SUSPENSION ORAL EVERY 6 HOURS PRN
Qty: 355 ML | Refills: 0 | Status: SHIPPED | OUTPATIENT
Start: 2024-08-12

## 2024-08-12 NOTE — PROGRESS NOTES
[facility]        NAME: George Weber is a 39 y.o. male  : 1985    MRN: [id number]  DATE: 2024  TIME: 8:17 PM    Assessment and Orders   Gastroesophageal reflux disease without esophagitis [K21.9]  1. Gastroesophageal reflux disease without esophagitis  Ambulatory Referral to Gastroenterology    famotidine (PEPCID) 20 mg tablet      2. Other constipation  Ambulatory Referral to Gastroenterology    aluminum-magnesium hydroxide-simethicone (MAALOX MAX) 400-400-40 MG/5ML suspension            Plan and Discussion      Symptoms and exam consistent with worsening GERD and constipation. Will start Famotidine to take in addition to the omeprazole. Also ordered Mylanta for symptom relief to be taken as needed. Will refer to GI as this has been an ongoing issue.      Risks and benefits discussed. Patient understands and agrees with the plan.     PATIENT INSTRUCTIONS    If tests have been performed at [facility], our office will contact you with results if changes need to be made to the care plan discussed with you at the visit.  [facility]    Follow up with PCP.     If any of the following occur, please report to your nearest ED for evaluation or call 911.   Difficultly breathing or shortness of breath  Chest pain  Acutely worsening symptoms.         Chief Complaint     Chief Complaint   Patient presents with    Heartburn     Reports increased heart burn over the past 3-4 days. Also reports constipation. + bm today small amount. Denies nausea or vomiting. Reports when hold Prilosec dose constipation reduces. Last dose of mirlax yesterday. Today took pepto and tums which is not helping.     Constipation         History of Present Illness       Feeling very backed up.  Last BM was last night - regular. Stool is hard. Has been taking Miralax.   Normally takes Omeprazole every day with pepto and tums as needed. Helps for a little but then came right back.    Does not have a GI.   This has been going on for a few years.     Heartburn  He complains of abdominal pain, belching, early satiety and heartburn. He reports no nausea.   Constipation  Associated symptoms include abdominal pain. Pertinent negatives include no nausea.       Review of Systems   Review of Systems   Gastrointestinal:  Positive for abdominal pain, constipation and heartburn. Negative for nausea.         Current Medications       Current Outpatient Medications:     aluminum-magnesium hydroxide-simethicone (MAALOX MAX) 400-400-40 MG/5ML suspension, Take 10 mL by mouth every 6 (six) hours as needed for indigestion or heartburn, Disp: 355 mL, Rfl: 0    azelastine (ASTELIN) 0.1 % nasal spray, 1 spray into each nostril 2 (two) times a day Use in each nostril as directed, Disp: 30 mL, Rfl: 0    B-D ULTRAFINE III SHORT PEN 31G X 8 MM MISC, USE 1 PEN NEEDLE 4 TIMES DAILY, BEFORE MEAL(S) AND AT BEDTIME, Disp: 100 each, Rfl: 0    Blood Glucose Calibration (ACCU-CHEK SYLVESTER) SOLN, by In Vitro route, Disp: , Rfl:     Blood Glucose Monitoring Suppl (OneTouch Verio) w/Device KIT, Use 4 (four) times a day, Disp: 1 kit, Rfl: 0    Blood Pressure Monitor KIT, Use daily, Disp: 1 kit, Rfl: 0    famotidine (PEPCID) 20 mg tablet, Take 1 tablet (20 mg total) by mouth 2 (two) times a day, Disp: 60 tablet, Rfl: 0    FLUoxetine (PROzac) 40 MG capsule, Take 1 capsule by mouth once daily, Disp: 90 capsule, Rfl: 0    gabapentin (NEURONTIN) 300 mg capsule, Take 2 capsules (600 mg total) by mouth 3 (three) times a day, Disp: 540 capsule, Rfl: 0    Glucagon HCl 1 MG SOLR, Inject 1 mg under the skin, Disp: , Rfl:     glucose blood test strip, Measure blood glucose 3 times daily - before meals and at bedtime., Disp: 200 each, Rfl: 2    Insulin Glargine-yfgn 100 UNIT/ML SOPN, Inject 0.45 mL (45 Units total) under the skin daily at bedtime, Disp: 15 mL, Rfl: 2    insulin lispro (HumaLOG) 100 units/mL injection, INJECT 10 UNITS WITH  "BREAKFAST, 10 UNITS WITH LUNCH, 15 UNITS WITH SUPPER, Disp: 10 mL, Rfl: 3    Insulin Pen Needle (PEN NEEDLES 29GX1/2\") 29G X 12MM MISC, Use with insulin four times daily, before meals and at bedtime, Disp: 200 each, Rfl: 5    Insulin Syringe-Needle U-100 31G X 5/16\" 0.5 ML MISC, Inject under the skin 4 (four) times a day, Disp: 200 each, Rfl: 2    Lancets (OneTouch Delica Plus [id number]) MISC, USE 1  TO CHECK GLUCOSE 4 TIMES DAILY, Disp: 400 each, Rfl: 0    Lancets Ultra Thin 30G MISC, Use to check sugar three times daily, Disp: 200 each, Rfl: 2    levocetirizine (XYZAL) 5 MG tablet, Take 1 tablet (5 mg total) by mouth every evening, Disp: 30 tablet, Rfl: 0    lisinopril (ZESTRIL) 5 mg tablet, Take 1 tablet (5 mg total) by mouth daily, Disp: 90 tablet, Rfl: 0    montelukast (SINGULAIR) 10 mg tablet, Take 1 tablet (10 mg total) by mouth daily at bedtime, Disp: 90 tablet, Rfl: 0    omeprazole (PriLOSEC) 40 MG capsule, Take 1 capsule (40 mg total) by mouth daily, Disp: 90 capsule, Rfl: 3    polyethylene glycol (GLYCOLAX) 17 GM/SCOOP powder, TAKE 17 GRAMS BY MOUTH DAILY, Disp: 510 g, Rfl: 0    rosuvastatin (CRESTOR) 5 mg tablet, TAKE 1 TABLET BY MOUTH ONCE DAILY AT BEDTIME, Disp: 90 tablet, Rfl: 0    tadalafil (CIALIS) 20 MG tablet, TAKE 1 TABLET BY MOUTH ONCE DAILY AS NEEDED ONE HOUR PRIOR TO SEXUAL ACTIVITY, Disp: 60 tablet, Rfl: 0    albuterol (ProAir HFA) 90 mcg/act inhaler, Inhale 2 puffs every 6 (six) hours as needed for wheezing (Patient not taking: Reported on 8/12/2024), Disp: 8.5 g, Rfl: 0    Current Allergies     Allergies as of 08/12/2024    (No Known Allergies)            The following portions of the patient's history were reviewed and updated as appropriate: allergies, current medications, past family history, past medical history, past social history, past surgical history and problem list.     Past Medical History:   Diagnosis Date    Attention deficit hyperactivity disorder (ADHD), combined type " 10/05/2015    Diabetes mellitus (HCC)        No past surgical history on file.    Family History   Problem Relation Age of Onset    Diabetes Mother     Stroke Maternal Grandfather          Medications have been verified.        Objective   /70   Pulse 93   Temp 98.1 °F (36.7 °C) (Temporal)   Resp 20   SpO2 97%   No LMP for male patient.       Physical Exam     Physical Exam  Constitutional:       General: He is not in acute distress.     Appearance: He is not ill-appearing.   Cardiovascular:      Rate and Rhythm: Normal rate and regular rhythm.   Abdominal:      General: Abdomen is flat. There is no distension.      Palpations: There is no mass.      Tenderness: There is no abdominal tenderness. There is no guarding or rebound.      Hernia: No hernia is present.   Neurological:      General: No focal deficit present.      Mental Status: He is alert and oriented to person, place, and time.   Psychiatric:         Mood and Affect: Mood normal.               Yasmine Lara DO

## 2024-08-12 NOTE — LETTER
August 12, 2024     Patient: George Weber   YOB: 1985   Date of Visit: 8/12/2024       To Whom It May Concern:    It is my medical opinion that George Weber may return to work on 8/13/2024 .    If you have any questions or concerns, please don't hesitate to call.         Sincerely,        Yasmine Lara,     CC: No Recipients

## 2024-08-13 DIAGNOSIS — E10.42 TYPE 1 DIABETES MELLITUS WITH DIABETIC POLYNEUROPATHY (HCC): Primary | ICD-10-CM

## 2024-08-13 RX ORDER — DULOXETIN HYDROCHLORIDE 30 MG/1
60 CAPSULE, DELAYED RELEASE ORAL DAILY
Qty: 60 CAPSULE | Refills: 1 | Status: SHIPPED | OUTPATIENT
Start: 2024-08-13

## 2024-08-29 ENCOUNTER — TELEPHONE (OUTPATIENT)
Dept: INTERNAL MEDICINE CLINIC | Facility: CLINIC | Age: 39
End: 2024-08-29

## 2024-09-26 DIAGNOSIS — J30.89 NON-SEASONAL ALLERGIC RHINITIS DUE TO OTHER ALLERGIC TRIGGER: ICD-10-CM

## 2024-09-26 DIAGNOSIS — N52.9 ERECTILE DYSFUNCTION, UNSPECIFIED ERECTILE DYSFUNCTION TYPE: ICD-10-CM

## 2024-09-26 RX ORDER — MONTELUKAST SODIUM 10 MG/1
10 TABLET ORAL
Qty: 90 TABLET | Refills: 0 | Status: SHIPPED | OUTPATIENT
Start: 2024-09-26

## 2024-09-26 RX ORDER — TADALAFIL 20 MG/1
TABLET ORAL
Qty: 60 TABLET | Refills: 0 | Status: SHIPPED | OUTPATIENT
Start: 2024-09-26

## 2024-10-01 DIAGNOSIS — E10.42 TYPE 1 DIABETES MELLITUS WITH DIABETIC POLYNEUROPATHY (HCC): ICD-10-CM

## 2024-10-02 DIAGNOSIS — E10.42 TYPE 1 DIABETES MELLITUS WITH DIABETIC POLYNEUROPATHY (HCC): ICD-10-CM

## 2024-10-02 RX ORDER — INSULIN LISPRO 100 [IU]/ML
INJECTION, SOLUTION INTRAVENOUS; SUBCUTANEOUS
Qty: 10 ML | Refills: 3 | Status: SHIPPED | OUTPATIENT
Start: 2024-10-02

## 2024-10-02 RX ORDER — DULOXETIN HYDROCHLORIDE 30 MG/1
60 CAPSULE, DELAYED RELEASE ORAL DAILY
Qty: 60 CAPSULE | Refills: 0 | Status: SHIPPED | OUTPATIENT
Start: 2024-10-02

## 2024-10-07 DIAGNOSIS — E10.42 TYPE 1 DIABETES MELLITUS WITH DIABETIC POLYNEUROPATHY (HCC): ICD-10-CM

## 2024-10-07 RX ORDER — INSULIN GLARGINE 100 [IU]/ML
INJECTION, SOLUTION SUBCUTANEOUS
Qty: 15 ML | Refills: 0 | Status: SHIPPED | OUTPATIENT
Start: 2024-10-07

## 2024-10-27 ENCOUNTER — OFFICE VISIT (OUTPATIENT)
Dept: URGENT CARE | Age: 39
End: 2024-10-27
Payer: COMMERCIAL

## 2024-10-27 VITALS
HEART RATE: 86 BPM | OXYGEN SATURATION: 99 % | TEMPERATURE: 97 F | SYSTOLIC BLOOD PRESSURE: 126 MMHG | RESPIRATION RATE: 18 BRPM | DIASTOLIC BLOOD PRESSURE: 84 MMHG

## 2024-10-27 DIAGNOSIS — J01.40 ACUTE PANSINUSITIS, RECURRENCE NOT SPECIFIED: Primary | ICD-10-CM

## 2024-10-27 DIAGNOSIS — E10.42 TYPE 1 DIABETES MELLITUS WITH DIABETIC POLYNEUROPATHY (HCC): ICD-10-CM

## 2024-10-27 PROCEDURE — 99213 OFFICE O/P EST LOW 20 MIN: CPT | Performed by: PHYSICIAN ASSISTANT

## 2024-10-27 NOTE — PROGRESS NOTES
"  [facility]        NAME: George Weber is a 39 y.o. male  : 1985    MRN: [id number]  DATE: 2024  TIME: 1:35 PM    Assessment and Plan   Acute pansinusitis, recurrence not specified [J01.40]  1. Acute pansinusitis, recurrence not specified  amoxicillin-clavulanate (AUGMENTIN) 875-125 mg per tablet            Patient Instructions     Take antibiotics as prescribed. Use Flonase or nasal saline spray for symptomatic treatment.   Stay hydrated with lots of water/fluids.     Follow-up with PCP in the next 1-2 days for reexamination and to ensure resolution of symptoms.  Go to the ED if any fevers, unable to stay hydrated, abdominal pain, chest pain, shortness of breath, change in voice, pain or difficulty swallowing, new or worsening symptoms or other concerning symptoms.      Chief Complaint     Chief Complaint   Patient presents with    Cold Like Symptoms     Facial pressure and congestion started 3 weeks ago. Getting progressively worse. Afebrile.          History of Present Illness       39-year-old male presents for evaluation of \"sinus infection\" x 3 weeks.  States he has been having sinus pressure, postnasal drip, runny/stuffy nose, intermittent sneezing.  States he has a history of sinus infections, states it feels the same.  States he has a history of seasonal allergies that always turn into the sinus infections.  Has been taking Xyzal/allergy medication with some improvement however he has not yet started his Flonase.  States he will start his Flonase.  He denies any fevers, chills or bodyaches.  Denies any recent travel or known sick contacts.  He denies any cough or sore throat.  Eating and drinking normally, staying hydrated.  States the Augmentin always works very well for him and is requesting Augmentin at this time.  Denies any chest pain, chest tightness, shortness of breath, wheezing, GI/ symptoms or other complaints.         Review of Systems   Review of Systems "   Constitutional:  Negative for activity change, appetite change, chills, fatigue and fever.   HENT:  Positive for congestion, postnasal drip, rhinorrhea, sinus pressure and sneezing. Negative for ear pain, sore throat, trouble swallowing and voice change.    Eyes:  Negative for itching and visual disturbance.   Respiratory:  Negative for cough, shortness of breath and wheezing.    Cardiovascular:  Negative for chest pain and leg swelling.   Gastrointestinal:  Negative for abdominal pain, diarrhea, nausea and vomiting.   Genitourinary:  Negative for decreased urine volume.   Musculoskeletal:  Negative for back pain, joint swelling, neck pain and neck stiffness.   Skin:  Negative for rash.   Neurological:  Negative for dizziness, seizures, weakness, numbness and headaches.   All other systems reviewed and are negative.        Current Medications       Current Outpatient Medications:     aluminum-magnesium hydroxide-simethicone (MAALOX MAX) 400-400-40 MG/5ML suspension, Take 10 mL by mouth every 6 (six) hours as needed for indigestion or heartburn, Disp: 355 mL, Rfl: 0    amoxicillin-clavulanate (AUGMENTIN) 875-125 mg per tablet, Take 1 tablet by mouth every 12 (twelve) hours for 7 days, Disp: 14 tablet, Rfl: 0    azelastine (ASTELIN) 0.1 % nasal spray, 1 spray into each nostril 2 (two) times a day Use in each nostril as directed, Disp: 30 mL, Rfl: 0    B-D ULTRAFINE III SHORT PEN 31G X 8 MM MISC, USE 1 PEN NEEDLE 4 TIMES DAILY, BEFORE MEAL(S) AND AT BEDTIME, Disp: 100 each, Rfl: 0    Blood Glucose Calibration (ACCU-CHEK SYLVESTER) SOLN, by In Vitro route, Disp: , Rfl:     Blood Glucose Monitoring Suppl (OneTouch Verio) w/Device KIT, Use 4 (four) times a day, Disp: 1 kit, Rfl: 0    Blood Pressure Monitor KIT, Use daily, Disp: 1 kit, Rfl: 0    DULoxetine (CYMBALTA) 30 mg delayed release capsule, Take 2 capsules by mouth once daily, Disp: 60 capsule, Rfl: 0    Glucagon HCl 1 MG SOLR, Inject 1 mg under the skin, Disp: , Rfl:  "    glucose blood test strip, Measure blood glucose 3 times daily - before meals and at bedtime., Disp: 200 each, Rfl: 2    insulin lispro (HumaLOG) 100 units/mL injection, INJECT 10 UNITS WITH BREAKFAST, 10 UNITS WITH LUNCH, 15 UNITS WITH SUPPER, Disp: 10 mL, Rfl: 3    Insulin Pen Needle (PEN NEEDLES 29GX1/2\") 29G X 12MM MISC, Use with insulin four times daily, before meals and at bedtime, Disp: 200 each, Rfl: 5    Insulin Syringe-Needle U-100 31G X 5/16\" 0.5 ML MISC, Inject under the skin 4 (four) times a day, Disp: 200 each, Rfl: 2    Lancets (OneTouch Delica Plus [id number]) MISC, USE 1  TO CHECK GLUCOSE 4 TIMES DAILY, Disp: 400 each, Rfl: 0    Lancets Ultra Thin 30G MISC, Use to check sugar three times daily, Disp: 200 each, Rfl: 2    Lantus SoloStar 100 units/mL SOPN, INJECT 45 UNITS UNDER THE SKIN DAILY AT BEDTIME, Disp: 15 mL, Rfl: 0    montelukast (SINGULAIR) 10 mg tablet, TAKE 1 TABLET BY MOUTH ONCE DAILY AT BEDTIME, Disp: 90 tablet, Rfl: 0    omeprazole (PriLOSEC) 40 MG capsule, Take 1 capsule (40 mg total) by mouth daily, Disp: 90 capsule, Rfl: 3    polyethylene glycol (GLYCOLAX) 17 GM/SCOOP powder, TAKE 17 GRAMS BY MOUTH DAILY, Disp: 510 g, Rfl: 0    rosuvastatin (CRESTOR) 5 mg tablet, TAKE 1 TABLET BY MOUTH ONCE DAILY AT BEDTIME, Disp: 90 tablet, Rfl: 0    tadalafil (CIALIS) 20 MG tablet, TAKE 1 TABLET BY MOUTH ONCE DAILY AS NEEDED ONE  HOUR  PRIOR  TO  SEXUAL  ACTIVITY, Disp: 60 tablet, Rfl: 0    albuterol (ProAir HFA) 90 mcg/act inhaler, Inhale 2 puffs every 6 (six) hours as needed for wheezing (Patient not taking: Reported on 8/12/2024), Disp: 8.5 g, Rfl: 0    famotidine (PEPCID) 20 mg tablet, Take 1 tablet (20 mg total) by mouth 2 (two) times a day, Disp: 60 tablet, Rfl: 0    gabapentin (NEURONTIN) 300 mg capsule, Take 2 capsules (600 mg total) by mouth 3 (three) times a day, Disp: 540 capsule, Rfl: 0    levocetirizine (XYZAL) 5 MG tablet, Take 1 tablet (5 mg total) by mouth every evening, Disp: 30 " tablet, Rfl: 0    lisinopril (ZESTRIL) 5 mg tablet, Take 1 tablet (5 mg total) by mouth daily, Disp: 90 tablet, Rfl: 0    Current Allergies     Allergies as of 10/27/2024    (No Known Allergies)            The following portions of the patient's history were reviewed and updated as appropriate: allergies, current medications, past family history, past medical history, past social history, past surgical history and problem list.     Past Medical History:   Diagnosis Date    Attention deficit hyperactivity disorder (ADHD), combined type 10/05/2015    Diabetes mellitus (HCC)        No past surgical history on file.    Family History   Problem Relation Age of Onset    Diabetes Mother     Stroke Maternal Grandfather          Medications have been verified.        Objective   /84   Pulse 86   Temp (!) 97 °F (36.1 °C)   Resp 18   SpO2 99%        Physical Exam     Physical Exam  Vitals and nursing note reviewed.   Constitutional:       General: He is not in acute distress.     Appearance: Normal appearance. He is well-developed. He is not toxic-appearing.   HENT:      Right Ear: Tympanic membrane normal.      Left Ear: Tympanic membrane normal.      Nose:      Right Sinus: Maxillary sinus tenderness and frontal sinus tenderness present.      Left Sinus: Maxillary sinus tenderness and frontal sinus tenderness present.      Mouth/Throat:      Mouth: Mucous membranes are moist.      Pharynx: Oropharynx is clear. Uvula midline. Postnasal drip (mild) present. No pharyngeal swelling, oropharyngeal exudate, posterior oropharyngeal erythema or uvula swelling.   Eyes:      Extraocular Movements: Extraocular movements intact.      Pupils: Pupils are equal, round, and reactive to light.   Cardiovascular:      Rate and Rhythm: Normal rate and regular rhythm.      Heart sounds: Normal heart sounds.   Pulmonary:      Effort: Pulmonary effort is normal. No respiratory distress.      Breath sounds: Normal breath sounds. No  wheezing.   Musculoskeletal:      Cervical back: Normal range of motion and neck supple. No rigidity.   Skin:     Capillary Refill: Capillary refill takes less than 2 seconds.   Neurological:      Mental Status: He is alert and oriented to person, place, and time.   Psychiatric:         Behavior: Behavior normal.

## 2024-10-27 NOTE — PATIENT INSTRUCTIONS
Take antibiotics as prescribed. Use Flonase or nasal saline spray for symptomatic treatment.   Stay hydrated with lots of water/fluids.     Follow-up with PCP in the next 1-2 days for reexamination and to ensure resolution of symptoms.  Go to the ED if any fevers, unable to stay hydrated, abdominal pain, chest pain, shortness of breath, change in voice, pain or difficulty swallowing, new or worsening symptoms or other concerning symptoms.

## 2024-10-28 DIAGNOSIS — E10.42 TYPE 1 DIABETES MELLITUS WITH DIABETIC POLYNEUROPATHY (HCC): ICD-10-CM

## 2024-10-29 RX ORDER — DULOXETIN HYDROCHLORIDE 30 MG/1
60 CAPSULE, DELAYED RELEASE ORAL DAILY
Qty: 60 CAPSULE | Refills: 0 | Status: SHIPPED | OUTPATIENT
Start: 2024-10-29 | End: 2024-11-07 | Stop reason: SDUPTHER

## 2024-10-29 RX ORDER — INSULIN LISPRO 100 [IU]/ML
INJECTION, SOLUTION INTRAVENOUS; SUBCUTANEOUS
Qty: 10 ML | Refills: 3 | Status: SHIPPED | OUTPATIENT
Start: 2024-10-29

## 2024-11-07 ENCOUNTER — OFFICE VISIT (OUTPATIENT)
Dept: INTERNAL MEDICINE CLINIC | Facility: CLINIC | Age: 39
End: 2024-11-07

## 2024-11-07 VITALS
DIASTOLIC BLOOD PRESSURE: 84 MMHG | OXYGEN SATURATION: 98 % | HEART RATE: 84 BPM | WEIGHT: 218 LBS | TEMPERATURE: 98.3 F | SYSTOLIC BLOOD PRESSURE: 135 MMHG | BODY MASS INDEX: 34.14 KG/M2

## 2024-11-07 DIAGNOSIS — E78.2 MIXED HYPERLIPIDEMIA: ICD-10-CM

## 2024-11-07 DIAGNOSIS — J30.1 ALLERGIC RHINITIS DUE TO POLLEN, UNSPECIFIED SEASONALITY: ICD-10-CM

## 2024-11-07 DIAGNOSIS — F41.8 OTHER SPECIFIED ANXIETY DISORDERS: ICD-10-CM

## 2024-11-07 DIAGNOSIS — I10 BENIGN ESSENTIAL HYPERTENSION: ICD-10-CM

## 2024-11-07 DIAGNOSIS — Z11.4 ENCOUNTER FOR SCREENING FOR HUMAN IMMUNODEFICIENCY VIRUS (HIV): ICD-10-CM

## 2024-11-07 DIAGNOSIS — E66.09 CLASS 1 OBESITY DUE TO EXCESS CALORIES WITH SERIOUS COMORBIDITY AND BODY MASS INDEX (BMI) OF 34.0 TO 34.9 IN ADULT: ICD-10-CM

## 2024-11-07 DIAGNOSIS — Z11.59 ENCOUNTER FOR HEPATITIS C SCREENING TEST FOR LOW RISK PATIENT: ICD-10-CM

## 2024-11-07 DIAGNOSIS — E66.811 CLASS 1 OBESITY DUE TO EXCESS CALORIES WITH SERIOUS COMORBIDITY AND BODY MASS INDEX (BMI) OF 34.0 TO 34.9 IN ADULT: ICD-10-CM

## 2024-11-07 DIAGNOSIS — N52.9 ERECTILE DYSFUNCTION, UNSPECIFIED ERECTILE DYSFUNCTION TYPE: ICD-10-CM

## 2024-11-07 DIAGNOSIS — Z23 ENCOUNTER FOR IMMUNIZATION: ICD-10-CM

## 2024-11-07 DIAGNOSIS — E10.42 TYPE 1 DIABETES MELLITUS WITH DIABETIC POLYNEUROPATHY (HCC): Primary | ICD-10-CM

## 2024-11-07 LAB
LEFT EYE DIABETIC RETINOPATHY: NORMAL
LEFT EYE IMAGE QUALITY: NORMAL
LEFT EYE MACULAR EDEMA: NORMAL
LEFT EYE OTHER RETINOPATHY: NORMAL
RIGHT EYE DIABETIC RETINOPATHY: NORMAL
RIGHT EYE IMAGE QUALITY: NORMAL
RIGHT EYE MACULAR EDEMA: NORMAL
RIGHT EYE OTHER RETINOPATHY: NORMAL
SEVERITY (EYE EXAM): NORMAL
SL AMB POCT HEMOGLOBIN AIC: 5.6 (ref ?–6.5)

## 2024-11-07 RX ORDER — SYRINGE-NEEDLE,INSULIN,0.5 ML 27GX1/2"
SYRINGE, EMPTY DISPOSABLE MISCELLANEOUS
Qty: 200 EACH | Refills: 5 | Status: SHIPPED | OUTPATIENT
Start: 2024-11-07

## 2024-11-07 RX ORDER — DULOXETIN HYDROCHLORIDE 30 MG/1
30 CAPSULE, DELAYED RELEASE ORAL DAILY
Qty: 90 CAPSULE | Refills: 1 | Status: SHIPPED | OUTPATIENT
Start: 2024-11-07

## 2024-11-07 RX ORDER — TADALAFIL 20 MG/1
TABLET ORAL
Qty: 60 TABLET | Refills: 0 | Status: SHIPPED | OUTPATIENT
Start: 2024-11-07

## 2024-11-07 RX ORDER — INSULIN GLARGINE 100 [IU]/ML
35 INJECTION, SOLUTION SUBCUTANEOUS
Qty: 15 ML | Refills: 0 | Status: SHIPPED | OUTPATIENT
Start: 2024-11-07

## 2024-11-07 RX ORDER — MONTELUKAST SODIUM 10 MG/1
10 TABLET ORAL
Qty: 90 TABLET | Refills: 0 | Status: SHIPPED | OUTPATIENT
Start: 2024-11-07

## 2024-11-07 RX ORDER — LEVOCETIRIZINE DIHYDROCHLORIDE 5 MG/1
5 TABLET, FILM COATED ORAL EVERY EVENING
Qty: 90 TABLET | Refills: 3 | Status: SHIPPED | OUTPATIENT
Start: 2024-11-07

## 2024-11-07 RX ORDER — PEN NEEDLE, DIABETIC 29 G X1/2"
NEEDLE, DISPOSABLE MISCELLANEOUS DAILY
Qty: 100 EACH | Refills: 11 | Status: SHIPPED | OUTPATIENT
Start: 2024-11-07 | End: 2024-11-20 | Stop reason: ALTCHOICE

## 2024-11-07 RX ORDER — DULOXETIN HYDROCHLORIDE 60 MG/1
60 CAPSULE, DELAYED RELEASE ORAL DAILY
Qty: 90 CAPSULE | Refills: 1 | Status: SHIPPED | OUTPATIENT
Start: 2024-11-07

## 2024-11-12 PROBLEM — K59.09 OTHER CONSTIPATION: Status: RESOLVED | Noted: 2022-09-06 | Resolved: 2024-11-12

## 2024-11-12 PROBLEM — Z00.00 ANNUAL PHYSICAL EXAM: Status: RESOLVED | Noted: 2023-11-17 | Resolved: 2024-11-12

## 2024-11-12 PROBLEM — E66.09 CLASS 1 OBESITY DUE TO EXCESS CALORIES WITH SERIOUS COMORBIDITY AND BODY MASS INDEX (BMI) OF 34.0 TO 34.9 IN ADULT: Status: ACTIVE | Noted: 2023-11-17

## 2024-11-12 PROBLEM — N52.9 ERECTILE DYSFUNCTION: Status: ACTIVE | Noted: 2024-11-12

## 2024-11-12 PROBLEM — F41.8 OTHER SPECIFIED ANXIETY DISORDERS: Status: ACTIVE | Noted: 2024-11-12

## 2024-11-12 PROBLEM — E66.811 CLASS 1 OBESITY DUE TO EXCESS CALORIES WITH SERIOUS COMORBIDITY AND BODY MASS INDEX (BMI) OF 34.0 TO 34.9 IN ADULT: Status: ACTIVE | Noted: 2023-11-17

## 2024-11-12 PROBLEM — M65.341 TRIGGER RING FINGER OF RIGHT HAND: Status: RESOLVED | Noted: 2022-09-06 | Resolved: 2024-11-12

## 2024-11-12 NOTE — PROGRESS NOTES
"Ambulatory Visit  Name: George Weber      : 1985      MRN: [id number]  Encounter Provider: Christina Bernardo PA-C  Encounter Date: 2024   Encounter department: [facility]    Assessment & Plan  Type 1 diabetes mellitus with diabetic polyneuropathy (HCC)    Lab Results   Component Value Date    HGBA1C 5.6 2024     A1c below goal. He did not bring in his BS log. Call office if BS less than 70. Continue Lantus 35 units once daily. Continue Humalog 10 units with breakfast and lunch and 15 units with dinner. IRIS exam today. Declines foot exam. Urine micro ordered.   Orders:    DULoxetine (CYMBALTA) 30 mg delayed release capsule; Take 1 capsule (30 mg total) by mouth daily    DULoxetine (CYMBALTA) 60 mg delayed release capsule; Take 1 capsule (60 mg total) by mouth daily    Lantus SoloStar 100 units/mL SOPN; Inject 0.35 mL (35 Units total) under the skin daily after dinner    Insulin Pen Needle (CareTouch Pen Needles) 29G X 12MM MISC; Inject under the skin in the morning Use with insulin four times daily, before meals and at bedtime    Insulin Syringe-Needle U-100 31G X 5/16\" 0.5 ML MISC; Inject under the skin 3 (three) times a day with meals    IRIS Diabetic eye exam    POCT hemoglobin A1c    CBC and differential; Future    Comprehensive metabolic panel; Future    Albumin / creatinine urine ratio; Future    Benign essential hypertension  BP Readings from Last 3 Encounters:   24 135/84   10/27/24 126/84   24 122/70      Blood pressure controlled off of lisinopril. May need it for microalbuminuria. Will recheck.   Orders:    TSH, 3rd generation with Free T4 reflex; Future    Mixed hyperlipidemia  Lab Results   Component Value Date    CHOLESTEROL 164 2022    CHOLESTEROL 181 2020     Lab Results   Component Value Date    HDL 34 (L) 2022    HDL 44 2020    HDL 40 2015     Lab Results   Component Value Date    TRIG 71 2022    " TRIG 108 12/22/2020    TRIG 119 06/01/2015     Lab Results   Component Value Date    NONHDLC 137 12/22/2020      Lab Results   Component Value Date    LDLCALC 116 (H) 09/06/2022      Over due for lipid panel. Not taking statin.   Orders:    Lipid panel; Future    Other specified anxiety disorders  Doing better, but would like to increase duloxetine. Increase to 30 and 60 mg capsules once daily. May take 4-6 weeks to reach maximum efficacy. Will recheck next visit. Denies SI. Declines BH at this time.        Allergic rhinitis due to pollen, unspecified seasonality  Doing well. Continue Xyzal 5 mg once daily and Singulair 10 mg once daily.   Orders:    levocetirizine (XYZAL) 5 MG tablet; Take 1 tablet (5 mg total) by mouth every evening    montelukast (SINGULAIR) 10 mg tablet; Take 1 tablet (10 mg total) by mouth daily at bedtime    Erectile dysfunction, unspecified erectile dysfunction type  Continue Cialis 20 mg as needed.   Orders:    tadalafil (CIALIS) 20 MG tablet; TAKE 1 TABLET BY MOUTH ONCE DAILY AS NEEDED ONE HOUR PRIOR TO SEXUAL ACTIVITY    Encounter for immunization  Received influenza and COVID booster today. Tolerated well.   Orders:    influenza vaccine, recombinant, PF, 0.5 mL IM (Flublok)    COVID-19 Pfizer mRNA vaccine 12 yr and older (Comirnaty pre-filled syringe)    Encounter for hepatitis C screening test for low risk patient    Orders:    Hepatitis C antibody; Future    Encounter for screening for human immunodeficiency virus (HIV)    Orders:    HIV 1/2 AG/AB w Reflex [facility] for 2 yr old and above; Future    Class 1 obesity due to excess calories with serious comorbidity and body mass index (BMI) of 34.0 to 34.9 in adult       BMI Counseling: Body mass index is 34.14 kg/m². The BMI is above normal. Nutrition recommendations include decreasing portion sizes, encouraging healthy choices of fruits and vegetables, decreasing fast food intake, consuming healthier snacks, limiting drinks that contain  sugar, moderation in carbohydrate intake, increasing intake of lean protein, reducing intake of saturated and trans fat and reducing intake of cholesterol. Exercise recommendations include moderate physical activity 150 minutes/week, exercising 3-5 times per week and strength training exercises. No pharmacotherapy was ordered. Rationale for BMI follow-up plan is due to patient being overweight or obese.       History of Present Illness     Patient is a 39 year old male with a PMH of HTN, HLD, T1DM, and anxiety presenting for follow up. Patient has been a diabetic for over 10 years. Patient states he has been compliant with his insulin regimen. States he was having lower sugars so he did decrease his total daily insulin intake. He does check his sugar and states it has been better. He did not bring in a blood sugar log. Denies any hyperglycemic events recently. Denies polyphagia, polydipsia, and polyuria. No changes in weight or appetite. He has a history of neuropathy. Was taking gabapentin, but didn't like the way it made him feel. Now only taking duloxetine which he states helps. No numbness, tingling, or neuropathic pain as long as he takes the Cymbalta. Checks feet daily. Overall feeling well.   Stopped taking his rosuvastatin and lisinopril. He is not sure why. He feels he does not really need them.   He does sometimes feel anxious. He uses a marijuana vape which helps. Also states the duloxetine helps.          History obtained from : patient  Review of Systems   Constitutional:  Negative for activity change, appetite change, chills, diaphoresis, fatigue, fever and unexpected weight change.   HENT:  Negative for congestion, dental problem, hearing loss, postnasal drip, rhinorrhea, sore throat, tinnitus and trouble swallowing.    Eyes:  Negative for visual disturbance.   Respiratory:  Negative for cough, chest tightness, shortness of breath and wheezing.    Cardiovascular:  Negative for chest pain, palpitations  and leg swelling.   Gastrointestinal:  Negative for abdominal pain, blood in stool, constipation, diarrhea, nausea and vomiting.   Endocrine: Negative for cold intolerance, heat intolerance, polydipsia, polyphagia and polyuria.   Genitourinary:  Negative for decreased urine volume, dysuria, frequency, hematuria and urgency.   Musculoskeletal:  Negative for arthralgias and myalgias.   Skin:  Negative for rash and wound.   Neurological:  Positive for numbness (controlled). Negative for dizziness, tremors, seizures, syncope, weakness, light-headedness and headaches.   Hematological:  Negative for adenopathy.   Psychiatric/Behavioral:  Negative for dysphoric mood, self-injury, sleep disturbance and suicidal ideas. The patient is nervous/anxious.      Past Medical History   Past Medical History:   Diagnosis Date    Attention deficit hyperactivity disorder (ADHD), combined type 10/05/2015    Diabetes mellitus (HCC)     Trigger ring finger of right hand 09/06/2022     History reviewed. No pertinent surgical history.  Family History   Problem Relation Age of Onset    Diabetes Mother     Stroke Maternal Grandfather      Current Outpatient Medications on File Prior to Visit   Medication Sig Dispense Refill    azelastine (ASTELIN) 0.1 % nasal spray 1 spray into each nostril 2 (two) times a day Use in each nostril as directed 30 mL 0    insulin lispro (HumALOG/ADMELOG) 100 units/mL injection INJECT 10 UNITS UNDER THE SKIN WITH BREAKFAST, 10 UNITS WITH LUNCH, AND 15 UNITS WITH DINNER 10 mL 3    omeprazole (PriLOSEC) 40 MG capsule Take 1 capsule (40 mg total) by mouth daily 90 capsule 3     No current facility-administered medications on file prior to visit.   No Known Allergies   Current Outpatient Medications on File Prior to Visit   Medication Sig Dispense Refill    azelastine (ASTELIN) 0.1 % nasal spray 1 spray into each nostril 2 (two) times a day Use in each nostril as directed 30 mL 0    insulin lispro (HumALOG/ADMELOG)  100 units/mL injection INJECT 10 UNITS UNDER THE SKIN WITH BREAKFAST, 10 UNITS WITH LUNCH, AND 15 UNITS WITH DINNER 10 mL 3    omeprazole (PriLOSEC) 40 MG capsule Take 1 capsule (40 mg total) by mouth daily 90 capsule 3     No current facility-administered medications on file prior to visit.      Social History     Tobacco Use    Smoking status: Never    Smokeless tobacco: Never   Vaping Use    Vaping status: Former   Substance and Sexual Activity    Alcohol use: Never    Drug use: Not Currently     Types: Marijuana     Comment: has medical marijuana card    Sexual activity: Yes     Partners: Female         Objective     /84 (BP Location: Left arm, Patient Position: Sitting, Cuff Size: Standard)   Pulse 84   Temp 98.3 °F (36.8 °C) (Temporal)   Wt 98.9 kg (218 lb)   SpO2 98%   BMI 34.14 kg/m²     Physical Exam  Vitals and nursing note reviewed.   Constitutional:       General: He is awake. He is not in acute distress.     Appearance: Normal appearance. He is well-developed and well-groomed. He is obese. He is not ill-appearing.   HENT:      Head: Normocephalic and atraumatic.      Right Ear: Tympanic membrane, ear canal and external ear normal.      Left Ear: Tympanic membrane, ear canal and external ear normal.      Nose: Nose normal. No congestion or rhinorrhea.      Mouth/Throat:      Mouth: Mucous membranes are moist.      Pharynx: Oropharynx is clear. No oropharyngeal exudate or posterior oropharyngeal erythema.   Eyes:      General: No scleral icterus.     Extraocular Movements: Extraocular movements intact.      Conjunctiva/sclera: Conjunctivae normal.      Pupils: Pupils are equal, round, and reactive to light.   Cardiovascular:      Rate and Rhythm: Normal rate and regular rhythm.      Pulses: Normal pulses.           Radial pulses are 2+ on the right side and 2+ on the left side.      Heart sounds: Normal heart sounds. No murmur heard.  Pulmonary:      Effort: Pulmonary effort is normal. No  respiratory distress.      Breath sounds: Normal breath sounds and air entry. No decreased air movement. No decreased breath sounds, wheezing, rhonchi or rales.   Abdominal:      General: Abdomen is flat. Bowel sounds are normal. There is no distension.      Palpations: Abdomen is soft. There is no mass.      Tenderness: There is no abdominal tenderness. There is no guarding or rebound.      Hernia: No hernia is present.   Musculoskeletal:         General: Normal range of motion.      Cervical back: Neck supple.      Right lower leg: No edema.      Left lower leg: No edema.   Lymphadenopathy:      Cervical: No cervical adenopathy.   Skin:     General: Skin is warm.      Capillary Refill: Capillary refill takes less than 2 seconds.      Coloration: Skin is not jaundiced.      Findings: No rash.   Neurological:      General: No focal deficit present.      Mental Status: He is alert and oriented to person, place, and time. Mental status is at baseline.      Coordination: Coordination is intact.      Gait: Gait is intact.   Psychiatric:         Attention and Perception: Attention normal.         Mood and Affect: Mood and affect normal.         Speech: Speech normal.         Behavior: Behavior normal. Behavior is cooperative.       Administrative Statements   I have spent a total time of 30 minutes in caring for this patient on the day of the visit/encounter including Diagnostic results, Prognosis, Risks and benefits of tx options, Instructions for management, Patient and family education, Importance of tx compliance, Risk factor reductions, Impressions, Counseling / Coordination of care, Reviewing / ordering tests, medicine, procedures  , and Obtaining or reviewing history  .

## 2024-11-12 NOTE — ASSESSMENT & PLAN NOTE
Doing better, but would like to increase duloxetine. Increase to 30 and 60 mg capsules once daily. May take 4-6 weeks to reach maximum efficacy. Will recheck next visit. Denies SI. Declines BH at this time.

## 2024-11-12 NOTE — ASSESSMENT & PLAN NOTE
Doing well. Continue Xyzal 5 mg once daily and Singulair 10 mg once daily.   Orders:    levocetirizine (XYZAL) 5 MG tablet; Take 1 tablet (5 mg total) by mouth every evening    montelukast (SINGULAIR) 10 mg tablet; Take 1 tablet (10 mg total) by mouth daily at bedtime

## 2024-11-12 NOTE — ASSESSMENT & PLAN NOTE
"  Lab Results   Component Value Date    HGBA1C 5.6 11/07/2024     A1c below goal. He did not bring in his BS log. Call office if BS less than 70. Continue Lantus 35 units once daily. Continue Humalog 10 units with breakfast and lunch and 15 units with dinner. IRIS exam today. Declines foot exam. Urine micro ordered.   Orders:    DULoxetine (CYMBALTA) 30 mg delayed release capsule; Take 1 capsule (30 mg total) by mouth daily    DULoxetine (CYMBALTA) 60 mg delayed release capsule; Take 1 capsule (60 mg total) by mouth daily    Lantus SoloStar 100 units/mL SOPN; Inject 0.35 mL (35 Units total) under the skin daily after dinner    Insulin Pen Needle (CareTouch Pen Needles) 29G X 12MM MISC; Inject under the skin in the morning Use with insulin four times daily, before meals and at bedtime    Insulin Syringe-Needle U-100 31G X 5/16\" 0.5 ML MISC; Inject under the skin 3 (three) times a day with meals    IRIS Diabetic eye exam    POCT hemoglobin A1c    CBC and differential; Future    Comprehensive metabolic panel; Future    Albumin / creatinine urine ratio; Future    "

## 2024-11-12 NOTE — ASSESSMENT & PLAN NOTE
BP Readings from Last 3 Encounters:   11/07/24 135/84   10/27/24 126/84   08/12/24 122/70      Blood pressure controlled off of lisinopril. May need it for microalbuminuria. Will recheck.   Orders:    TSH, 3rd generation with Free T4 reflex; Future

## 2024-11-12 NOTE — ASSESSMENT & PLAN NOTE
Continue Cialis 20 mg as needed.   Orders:    tadalafil (CIALIS) 20 MG tablet; TAKE 1 TABLET BY MOUTH ONCE DAILY AS NEEDED ONE HOUR PRIOR TO SEXUAL ACTIVITY

## 2024-11-12 NOTE — ASSESSMENT & PLAN NOTE
Lab Results   Component Value Date    CHOLESTEROL 164 09/06/2022    CHOLESTEROL 181 12/22/2020     Lab Results   Component Value Date    HDL 34 (L) 09/06/2022    HDL 44 12/22/2020    HDL 40 06/01/2015     Lab Results   Component Value Date    TRIG 71 09/06/2022    TRIG 108 12/22/2020    TRIG 119 06/01/2015     Lab Results   Component Value Date    NONHDLC 137 12/22/2020      Lab Results   Component Value Date    LDLCALC 116 (H) 09/06/2022      Over due for lipid panel. Not taking statin.   Orders:    Lipid panel; Future

## 2024-11-20 ENCOUNTER — TELEPHONE (OUTPATIENT)
Dept: INTERNAL MEDICINE CLINIC | Facility: CLINIC | Age: 39
End: 2024-11-20

## 2024-11-20 DIAGNOSIS — E10.42 TYPE 1 DIABETES MELLITUS WITH DIABETIC POLYNEUROPATHY (HCC): Primary | ICD-10-CM

## 2024-11-20 NOTE — TELEPHONE ENCOUNTER
Received call requesting a new script for pen needles. They are requesting that a new script is sent for the needles that they have available.    Please send script for BD Carolin 4mm x 32g , BD Ultra fine 6mm x 31g, or BD Ultra fine 8mm x 31g

## 2024-11-25 ENCOUNTER — TELEPHONE (OUTPATIENT)
Dept: INTERNAL MEDICINE CLINIC | Facility: CLINIC | Age: 39
End: 2024-11-25

## 2024-11-25 NOTE — TELEPHONE ENCOUNTER
Prior authorization initiated for patient's Tadalafil 20 mg tablets on Cover my Meds.     Pending review.     Key: [id number]

## 2024-11-26 NOTE — TELEPHONE ENCOUNTER
Patient's prior authorization for Tadalafil 20 mg tablets denied. Per insurance, medication is not covered under his pharmacy benefits package.     Please review and advise.

## 2024-11-27 NOTE — TELEPHONE ENCOUNTER
Contacted patient's [facility] at . Spoke with Conchis VAUGHAN Patient's plan excludes any medications for Erectile Dysfunction. No alternative options.     Attempted to reach patient at . Left detailed voice message regarding medication, prior authorization denial and having to pay out of pocket.     Office number provided for any follow up questions/concerns.

## 2025-01-30 DIAGNOSIS — N52.9 ERECTILE DYSFUNCTION, UNSPECIFIED ERECTILE DYSFUNCTION TYPE: ICD-10-CM

## 2025-01-30 RX ORDER — TADALAFIL 20 MG/1
TABLET ORAL
Qty: 60 TABLET | Refills: 0 | Status: SHIPPED | OUTPATIENT
Start: 2025-01-30

## 2025-01-30 RX ORDER — TADALAFIL 20 MG/1
TABLET ORAL
Qty: 60 TABLET | Refills: 0 | OUTPATIENT
Start: 2025-01-30

## 2025-02-07 ENCOUNTER — TELEPHONE (OUTPATIENT)
Dept: INTERNAL MEDICINE CLINIC | Facility: CLINIC | Age: 40
End: 2025-02-07

## 2025-02-26 DIAGNOSIS — J01.40 ACUTE NON-RECURRENT PANSINUSITIS: Primary | ICD-10-CM

## 2025-04-23 DIAGNOSIS — E10.42 TYPE 1 DIABETES MELLITUS WITH DIABETIC POLYNEUROPATHY (HCC): ICD-10-CM

## 2025-04-23 DIAGNOSIS — N52.9 ERECTILE DYSFUNCTION, UNSPECIFIED ERECTILE DYSFUNCTION TYPE: ICD-10-CM

## 2025-04-24 RX ORDER — INSULIN GLARGINE 100 [IU]/ML
INJECTION, SOLUTION SUBCUTANEOUS
Qty: 15 ML | Refills: 1 | Status: SHIPPED | OUTPATIENT
Start: 2025-04-24

## 2025-04-24 RX ORDER — TADALAFIL 20 MG/1
TABLET ORAL
Qty: 60 TABLET | Refills: 0 | Status: SHIPPED | OUTPATIENT
Start: 2025-04-24

## 2025-05-22 DIAGNOSIS — E10.42 TYPE 1 DIABETES MELLITUS WITH DIABETIC POLYNEUROPATHY (HCC): ICD-10-CM

## 2025-05-22 RX ORDER — INSULIN LISPRO 100 [IU]/ML
INJECTION, SOLUTION INTRAVENOUS; SUBCUTANEOUS
Qty: 10 ML | Refills: 3 | Status: SHIPPED | OUTPATIENT
Start: 2025-05-22

## 2025-05-28 ENCOUNTER — TELEPHONE (OUTPATIENT)
Dept: INTERNAL MEDICINE CLINIC | Facility: CLINIC | Age: 40
End: 2025-05-28

## 2025-05-28 NOTE — TELEPHONE ENCOUNTER
Folder Color- Blue     Name of Form- [employer] Med Cert    Form to be filled out by- Dr. Burr     Form to be emailed to [email or web address]    Patient made aware of 10 business day policy.

## 2025-05-28 NOTE — TELEPHONE ENCOUNTER
Called patient to verify if he wanted form faxed or picked up. Patient had an appointment on 6/20, I rescheduled appt for 6/2 with Dr. Burr just in case he needed to be seen in order for form to be completed

## 2025-06-02 ENCOUNTER — OFFICE VISIT (OUTPATIENT)
Dept: INTERNAL MEDICINE CLINIC | Facility: CLINIC | Age: 40
End: 2025-06-02

## 2025-06-02 VITALS
WEIGHT: 215.2 LBS | DIASTOLIC BLOOD PRESSURE: 78 MMHG | TEMPERATURE: 97.8 F | OXYGEN SATURATION: 98 % | HEIGHT: 69 IN | HEART RATE: 105 BPM | BODY MASS INDEX: 31.87 KG/M2 | SYSTOLIC BLOOD PRESSURE: 120 MMHG

## 2025-06-02 DIAGNOSIS — Z00.00 ENCOUNTER FOR ANNUAL PHYSICAL EXAM: Primary | ICD-10-CM

## 2025-06-02 DIAGNOSIS — G63 POLYNEUROPATHY ASSOCIATED WITH UNDERLYING DISEASE (HCC): ICD-10-CM

## 2025-06-02 DIAGNOSIS — F32.A DEPRESSION, UNSPECIFIED DEPRESSION TYPE: ICD-10-CM

## 2025-06-02 DIAGNOSIS — N52.9 ERECTILE DYSFUNCTION, UNSPECIFIED ERECTILE DYSFUNCTION TYPE: ICD-10-CM

## 2025-06-02 DIAGNOSIS — J30.1 ALLERGIC RHINITIS DUE TO POLLEN, UNSPECIFIED SEASONALITY: ICD-10-CM

## 2025-06-02 DIAGNOSIS — E10.42 TYPE 1 DIABETES MELLITUS WITH DIABETIC POLYNEUROPATHY (HCC): ICD-10-CM

## 2025-06-02 LAB — SL AMB POCT HEMOGLOBIN AIC: 5.8 (ref ?–6.5)

## 2025-06-02 PROCEDURE — 83036 HEMOGLOBIN GLYCOSYLATED A1C: CPT | Performed by: HOSPITALIST

## 2025-06-02 PROCEDURE — 99385 PREV VISIT NEW AGE 18-39: CPT | Performed by: HOSPITALIST

## 2025-06-02 NOTE — PROGRESS NOTES
Name: George Weber      : 1985      MRN: [id number]  Encounter Provider: Denis Brur MD  Encounter Date: 2025   Encounter department: [facility]  :  Assessment & Plan  Encounter for annual physical exam  Patient presenting for annual physical examination. Counseled extensively regarding alcohol/drug use, dental care, injury prevention, sexual health, exercise, diet and development and relevant testing/medications as outlined below.    has not had a dental visit within the last year. Recommended to see a dentist at least once annually.  has not had a optometry/ophthalmology visit within the last year, does wear corrective lenses. Recommended to see an eye doctor at least once annually.  Patient does not smoke cigarettes/cigars and/or chew tobacco. Encouraged to continue abstaining from tobacco use and avoiding secondhand exposure when possible.  Patient does not drink alcohol. Counseled against drinking more than more than 2 standard alcoholic drinks per day / more than 14 drinks per week         Type 1 diabetes mellitus with diabetic polyneuropathy (HCC)    Hx of T1DM, does not follow with endocrinology.  On basal bolus insulin.  ~35 units long acting HS.  ~10u TID short acting w/ meals, adjusts dose with custom sliding scale.  A1c at this visit 5.8%, well controlled.  Has some residual neuropathy, takes Duloxetine.  Diabetic foot exam performed at this visit.    Due for repeat bloodwork by next visit.  Advised we would refer to endo if A1c markedly worsens, but given he is well controlled currently can continue to follow up with PCP alone.    Lab Results   Component Value Date    HGBA1C 5.8 2025       Orders:    POCT hemoglobin A1c    Depression, unspecified depression type  Previously on fluoxetine, switched to Duloxetine given his neuropathy.  Symptoms well controlled.  Unclear if on 30 vs 60 mg, patient was unsure - advised to review his meds at home and  "contact the clinic so we can adjust his medication list appropriately.         Polyneuropathy associated with underlying disease (HCC)  Diabetic neuropathy of bilateral feet, on Duloxetine for symptomatic control.       Erectile dysfunction, unspecified erectile dysfunction type  Likely secondary to diabetes, on Cialis.       Allergic rhinitis due to pollen, unspecified seasonality  On PRN Xyzal and Astelin spray.              History of Present Illness   39yM with history of T1DM with neuropathy and erectile dysfunction, allergic rhinitis, depression, presents to clinic for annual physical exam.  No active issues, sugars relatively well controlled at home with fasting -120 mg/dL, on basal bolus insulin.  Does not see endocrinology for his diabetes, primarily PCP.   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      Review of Systems   All other systems reviewed and are negative.      Objective   /78 (BP Location: Left arm, Patient Position: Sitting, Cuff Size: Large)   Pulse 105   Temp 97.8 °F (36.6 °C) (Temporal)   Ht 5' 9\" (1.753 m)   Wt 97.6 kg (215 lb 3.2 oz)   SpO2 98%   BMI 31.78 kg/m²      Physical Exam  Vitals reviewed.   Constitutional:       General: He is not in acute distress.  HENT:      Head: Normocephalic.      Mouth/Throat:      Mouth: Mucous membranes are moist.      Pharynx: Oropharynx is clear.     Eyes:      Extraocular Movements: Extraocular movements intact.      Pupils: Pupils are equal, round, and reactive to light.       Cardiovascular:      Rate and Rhythm: Normal rate and regular rhythm.      Pulses: no weak pulses.           Dorsalis pedis pulses are 2+ on the right side and 2+ on the left side.        Posterior tibial pulses are 2+ on the right side and 2+ on the left side.   Pulmonary:      Effort: " Pulmonary effort is normal. No respiratory distress.   Abdominal:      General: There is no distension.      Palpations: Abdomen is soft.      Tenderness: There is no abdominal tenderness.     Musculoskeletal:      Right lower leg: No edema.      Left lower leg: No edema.   Feet:      Right foot:      Skin integrity: No ulcer, skin breakdown, erythema, warmth, callus or dry skin.      Left foot:      Skin integrity: No ulcer, skin breakdown, erythema, warmth, callus or dry skin.     Skin:     General: Skin is warm and dry.     Neurological:      General: No focal deficit present.      Mental Status: He is alert and oriented to person, place, and time. Mental status is at baseline.       Patient's shoes and socks removed.    Right Foot/Ankle   Right Foot Inspection  Skin Exam: skin normal and skin intact. No dry skin, no warmth, no callus, no erythema, no maceration, no abnormal color, no pre-ulcer, no ulcer and no callus.     Toe Exam: ROM and strength within normal limits. No swelling, no tenderness, erythema and  no right toe deformity    Sensory   Vibration: intact  Proprioception: intact  Monofilament testing: intact    Vascular  Capillary refills: < 3 seconds  The right DP pulse is 2+. The right PT pulse is 2+.     Left Foot/Ankle  Left Foot Inspection  Skin Exam: skin normal and skin intact. No dry skin, no warmth, no erythema, no maceration, normal color, no pre-ulcer, no ulcer and no callus.     Toe Exam: ROM and strength within normal limits. No swelling, no tenderness, no erythema and no left toe deformity.     Sensory   Vibration: intact  Proprioception: intact  Monofilament testing: intact    Vascular  Capillary refills: < 3 seconds  The left DP pulse is 2+. The left PT pulse is 2+.     Assign Risk Category  No deformity present  No loss of protective sensation  No weak pulses  Risk: 0

## 2025-06-02 NOTE — ASSESSMENT & PLAN NOTE
Hx of T1DM, does not follow with endocrinology.  On basal bolus insulin.  ~35 units long acting HS.  ~10u TID short acting w/ meals, adjusts dose with custom sliding scale.  A1c at this visit 5.8%, well controlled.  Has some residual neuropathy, takes Duloxetine.  Diabetic foot exam performed at this visit.    Due for repeat bloodwork by next visit.  Advised we would refer to endo if A1c markedly worsens, but given he is well controlled currently can continue to follow up with PCP alone.    Lab Results   Component Value Date    HGBA1C 5.8 06/02/2025       Orders:    POCT hemoglobin A1c